# Patient Record
Sex: MALE | Race: WHITE | NOT HISPANIC OR LATINO | Employment: OTHER | URBAN - METROPOLITAN AREA
[De-identification: names, ages, dates, MRNs, and addresses within clinical notes are randomized per-mention and may not be internally consistent; named-entity substitution may affect disease eponyms.]

---

## 2022-07-30 ENCOUNTER — HOSPITAL ENCOUNTER (INPATIENT)
Facility: HOSPITAL | Age: 62
LOS: 5 days | Discharge: HOME HEALTH | End: 2022-08-04
Attending: EMERGENCY MEDICINE | Admitting: INTERNAL MEDICINE

## 2022-07-30 ENCOUNTER — APPOINTMENT (OUTPATIENT)
Dept: GENERAL RADIOLOGY | Facility: HOSPITAL | Age: 62
End: 2022-07-30

## 2022-07-30 DIAGNOSIS — L02.612 CELLULITIS AND ABSCESS OF TOE OF LEFT FOOT: Primary | ICD-10-CM

## 2022-07-30 DIAGNOSIS — R26.2 DIFFICULTY WALKING: ICD-10-CM

## 2022-07-30 DIAGNOSIS — L03.032 CELLULITIS AND ABSCESS OF TOE OF LEFT FOOT: Primary | ICD-10-CM

## 2022-07-30 DIAGNOSIS — Z78.9 DECREASED ACTIVITIES OF DAILY LIVING (ADL): ICD-10-CM

## 2022-07-30 PROBLEM — L02.619 CELLULITIS AND ABSCESS OF FOOT: Status: ACTIVE | Noted: 2022-07-30

## 2022-07-30 PROBLEM — L03.119 CELLULITIS AND ABSCESS OF FOOT: Status: ACTIVE | Noted: 2022-07-30

## 2022-07-30 LAB
ALBUMIN SERPL-MCNC: 2.9 G/DL (ref 3.5–5.2)
ALBUMIN/GLOB SERPL: 0.6 G/DL
ALP SERPL-CCNC: 175 U/L (ref 39–117)
ALT SERPL W P-5'-P-CCNC: 18 U/L (ref 1–41)
ANION GAP SERPL CALCULATED.3IONS-SCNC: 8.1 MMOL/L (ref 5–15)
ANION GAP SERPL CALCULATED.3IONS-SCNC: 9.7 MMOL/L (ref 5–15)
AST SERPL-CCNC: 22 U/L (ref 1–40)
BASOPHILS # BLD AUTO: 0.04 10*3/MM3 (ref 0–0.2)
BASOPHILS # BLD AUTO: 0.04 10*3/MM3 (ref 0–0.2)
BASOPHILS NFR BLD AUTO: 0.3 % (ref 0–1.5)
BASOPHILS NFR BLD AUTO: 0.3 % (ref 0–1.5)
BILIRUB SERPL-MCNC: 0.9 MG/DL (ref 0–1.2)
BUN SERPL-MCNC: 10 MG/DL (ref 6–20)
BUN SERPL-MCNC: 11 MG/DL (ref 6–20)
BUN/CREAT SERPL: 13 (ref 7–25)
BUN/CREAT SERPL: 15.1 (ref 7–25)
CALCIUM SPEC-SCNC: 8.8 MG/DL (ref 8.6–10.5)
CALCIUM SPEC-SCNC: 8.8 MG/DL (ref 8.6–10.5)
CHLORIDE SERPL-SCNC: 100 MMOL/L (ref 98–107)
CHLORIDE SERPL-SCNC: 97 MMOL/L (ref 98–107)
CO2 SERPL-SCNC: 24.3 MMOL/L (ref 22–29)
CO2 SERPL-SCNC: 27.9 MMOL/L (ref 22–29)
CREAT SERPL-MCNC: 0.73 MG/DL (ref 0.76–1.27)
CREAT SERPL-MCNC: 0.77 MG/DL (ref 0.76–1.27)
CRP SERPL-MCNC: 21.14 MG/DL (ref 0–0.5)
D-LACTATE SERPL-SCNC: 2 MMOL/L (ref 0.5–2)
DEPRECATED RDW RBC AUTO: 49.4 FL (ref 37–54)
DEPRECATED RDW RBC AUTO: 49.6 FL (ref 37–54)
EGFRCR SERPLBLD CKD-EPI 2021: 103.1 ML/MIN/1.73
EGFRCR SERPLBLD CKD-EPI 2021: 104.8 ML/MIN/1.73
EOSINOPHIL # BLD AUTO: 0.09 10*3/MM3 (ref 0–0.4)
EOSINOPHIL # BLD AUTO: 0.19 10*3/MM3 (ref 0–0.4)
EOSINOPHIL NFR BLD AUTO: 0.7 % (ref 0.3–6.2)
EOSINOPHIL NFR BLD AUTO: 1.5 % (ref 0.3–6.2)
ERYTHROCYTE [DISTWIDTH] IN BLOOD BY AUTOMATED COUNT: 14.8 % (ref 12.3–15.4)
ERYTHROCYTE [DISTWIDTH] IN BLOOD BY AUTOMATED COUNT: 15 % (ref 12.3–15.4)
ERYTHROCYTE [SEDIMENTATION RATE] IN BLOOD: 55 MM/HR (ref 0–20)
GLOBULIN UR ELPH-MCNC: 4.7 GM/DL
GLUCOSE SERPL-MCNC: 103 MG/DL (ref 65–99)
GLUCOSE SERPL-MCNC: 176 MG/DL (ref 65–99)
HCT VFR BLD AUTO: 45.7 % (ref 37.5–51)
HCT VFR BLD AUTO: 48.8 % (ref 37.5–51)
HGB BLD-MCNC: 14.9 G/DL (ref 13–17.7)
HGB BLD-MCNC: 15.6 G/DL (ref 13–17.7)
HOLD SPECIMEN: NORMAL
HOLD SPECIMEN: NORMAL
IMM GRANULOCYTES # BLD AUTO: 0.04 10*3/MM3 (ref 0–0.05)
IMM GRANULOCYTES # BLD AUTO: 0.05 10*3/MM3 (ref 0–0.05)
IMM GRANULOCYTES NFR BLD AUTO: 0.3 % (ref 0–0.5)
IMM GRANULOCYTES NFR BLD AUTO: 0.4 % (ref 0–0.5)
LYMPHOCYTES # BLD AUTO: 0.61 10*3/MM3 (ref 0.7–3.1)
LYMPHOCYTES # BLD AUTO: 1.05 10*3/MM3 (ref 0.7–3.1)
LYMPHOCYTES NFR BLD AUTO: 4.8 % (ref 19.6–45.3)
LYMPHOCYTES NFR BLD AUTO: 8.4 % (ref 19.6–45.3)
MCH RBC QN AUTO: 28.9 PG (ref 26.6–33)
MCH RBC QN AUTO: 29.3 PG (ref 26.6–33)
MCHC RBC AUTO-ENTMCNC: 32 G/DL (ref 31.5–35.7)
MCHC RBC AUTO-ENTMCNC: 32.6 G/DL (ref 31.5–35.7)
MCV RBC AUTO: 89.8 FL (ref 79–97)
MCV RBC AUTO: 90.5 FL (ref 79–97)
MONOCYTES # BLD AUTO: 0.53 10*3/MM3 (ref 0.1–0.9)
MONOCYTES # BLD AUTO: 1.08 10*3/MM3 (ref 0.1–0.9)
MONOCYTES NFR BLD AUTO: 4.1 % (ref 5–12)
MONOCYTES NFR BLD AUTO: 8.6 % (ref 5–12)
NEUTROPHILS NFR BLD AUTO: 10.1 10*3/MM3 (ref 1.7–7)
NEUTROPHILS NFR BLD AUTO: 11.52 10*3/MM3 (ref 1.7–7)
NEUTROPHILS NFR BLD AUTO: 80.9 % (ref 42.7–76)
NEUTROPHILS NFR BLD AUTO: 89.7 % (ref 42.7–76)
NRBC BLD AUTO-RTO: 0 /100 WBC (ref 0–0.2)
NRBC BLD AUTO-RTO: 0.2 /100 WBC (ref 0–0.2)
PLATELET # BLD AUTO: 229 10*3/MM3 (ref 140–450)
PLATELET # BLD AUTO: 237 10*3/MM3 (ref 140–450)
PMV BLD AUTO: 10.8 FL (ref 6–12)
PMV BLD AUTO: 11.3 FL (ref 6–12)
POTASSIUM SERPL-SCNC: 4 MMOL/L (ref 3.5–5.2)
POTASSIUM SERPL-SCNC: 4.3 MMOL/L (ref 3.5–5.2)
PROT SERPL-MCNC: 7.6 G/DL (ref 6–8.5)
RBC # BLD AUTO: 5.09 10*6/MM3 (ref 4.14–5.8)
RBC # BLD AUTO: 5.39 10*6/MM3 (ref 4.14–5.8)
SODIUM SERPL-SCNC: 131 MMOL/L (ref 136–145)
SODIUM SERPL-SCNC: 136 MMOL/L (ref 136–145)
WBC NRBC COR # BLD: 12.5 10*3/MM3 (ref 3.4–10.8)
WBC NRBC COR # BLD: 12.84 10*3/MM3 (ref 3.4–10.8)
WHOLE BLOOD HOLD COAG: NORMAL
WHOLE BLOOD HOLD SPECIMEN: NORMAL

## 2022-07-30 PROCEDURE — 25010000002 PIPERACILLIN SOD-TAZOBACTAM PER 1 G: Performed by: EMERGENCY MEDICINE

## 2022-07-30 PROCEDURE — 25010000002 TETANUS-DIPHTH-ACELL PERTUSSIS 5-2.5-18.5 LF-MCG/0.5 SUSPENSION PREFILLED SYRINGE: Performed by: EMERGENCY MEDICINE

## 2022-07-30 PROCEDURE — 85025 COMPLETE CBC W/AUTO DIFF WBC: CPT | Performed by: EMERGENCY MEDICINE

## 2022-07-30 PROCEDURE — 90471 IMMUNIZATION ADMIN: CPT | Performed by: EMERGENCY MEDICINE

## 2022-07-30 PROCEDURE — 73630 X-RAY EXAM OF FOOT: CPT

## 2022-07-30 PROCEDURE — 80053 COMPREHEN METABOLIC PANEL: CPT | Performed by: INTERNAL MEDICINE

## 2022-07-30 PROCEDURE — 25010000002 SODIUM CHLORIDE 0.9 % WITH KCL 20 MEQ 20-0.9 MEQ/L-% SOLUTION: Performed by: INTERNAL MEDICINE

## 2022-07-30 PROCEDURE — 87040 BLOOD CULTURE FOR BACTERIA: CPT | Performed by: EMERGENCY MEDICINE

## 2022-07-30 PROCEDURE — 94799 UNLISTED PULMONARY SVC/PX: CPT

## 2022-07-30 PROCEDURE — 85652 RBC SED RATE AUTOMATED: CPT | Performed by: EMERGENCY MEDICINE

## 2022-07-30 PROCEDURE — 25010000002 HYDROMORPHONE 1 MG/ML SOLUTION: Performed by: EMERGENCY MEDICINE

## 2022-07-30 PROCEDURE — 25010000002 ENOXAPARIN PER 10 MG: Performed by: INTERNAL MEDICINE

## 2022-07-30 PROCEDURE — 85025 COMPLETE CBC W/AUTO DIFF WBC: CPT | Performed by: INTERNAL MEDICINE

## 2022-07-30 PROCEDURE — 99283 EMERGENCY DEPT VISIT LOW MDM: CPT

## 2022-07-30 PROCEDURE — 90715 TDAP VACCINE 7 YRS/> IM: CPT | Performed by: EMERGENCY MEDICINE

## 2022-07-30 PROCEDURE — 86140 C-REACTIVE PROTEIN: CPT | Performed by: EMERGENCY MEDICINE

## 2022-07-30 PROCEDURE — 36415 COLL VENOUS BLD VENIPUNCTURE: CPT | Performed by: EMERGENCY MEDICINE

## 2022-07-30 PROCEDURE — 83605 ASSAY OF LACTIC ACID: CPT | Performed by: EMERGENCY MEDICINE

## 2022-07-30 PROCEDURE — 25010000002 VANCOMYCIN 5 G RECONSTITUTED SOLUTION: Performed by: EMERGENCY MEDICINE

## 2022-07-30 PROCEDURE — 99284 EMERGENCY DEPT VISIT MOD MDM: CPT

## 2022-07-30 RX ORDER — FAMOTIDINE 20 MG/1
20 TABLET, FILM COATED ORAL DAILY
Status: DISCONTINUED | OUTPATIENT
Start: 2022-07-30 | End: 2022-08-04 | Stop reason: HOSPADM

## 2022-07-30 RX ORDER — ACETAMINOPHEN 325 MG/1
650 TABLET ORAL EVERY 4 HOURS PRN
Status: DISCONTINUED | OUTPATIENT
Start: 2022-07-30 | End: 2022-08-04 | Stop reason: HOSPADM

## 2022-07-30 RX ORDER — BISACODYL 10 MG
10 SUPPOSITORY, RECTAL RECTAL DAILY PRN
Status: DISCONTINUED | OUTPATIENT
Start: 2022-07-30 | End: 2022-08-04 | Stop reason: HOSPADM

## 2022-07-30 RX ORDER — AMOXICILLIN 250 MG
2 CAPSULE ORAL NIGHTLY
Status: DISCONTINUED | OUTPATIENT
Start: 2022-07-30 | End: 2022-08-04 | Stop reason: HOSPADM

## 2022-07-30 RX ORDER — ACETAMINOPHEN 160 MG/5ML
650 SOLUTION ORAL EVERY 4 HOURS PRN
Status: DISCONTINUED | OUTPATIENT
Start: 2022-07-30 | End: 2022-08-04 | Stop reason: HOSPADM

## 2022-07-30 RX ORDER — POLYETHYLENE GLYCOL 3350 17 G/17G
17 POWDER, FOR SOLUTION ORAL DAILY PRN
Status: DISCONTINUED | OUTPATIENT
Start: 2022-07-30 | End: 2022-08-04 | Stop reason: HOSPADM

## 2022-07-30 RX ORDER — ACETAMINOPHEN 650 MG/1
650 SUPPOSITORY RECTAL EVERY 4 HOURS PRN
Status: DISCONTINUED | OUTPATIENT
Start: 2022-07-30 | End: 2022-08-04 | Stop reason: HOSPADM

## 2022-07-30 RX ORDER — HYDROCODONE BITARTRATE AND ACETAMINOPHEN 5; 325 MG/1; MG/1
1 TABLET ORAL EVERY 4 HOURS PRN
Status: DISCONTINUED | OUTPATIENT
Start: 2022-07-30 | End: 2022-08-04 | Stop reason: HOSPADM

## 2022-07-30 RX ORDER — ENOXAPARIN SODIUM 100 MG/ML
40 INJECTION SUBCUTANEOUS DAILY
Status: DISCONTINUED | OUTPATIENT
Start: 2022-07-30 | End: 2022-08-04 | Stop reason: HOSPADM

## 2022-07-30 RX ORDER — BISACODYL 5 MG/1
5 TABLET, DELAYED RELEASE ORAL DAILY PRN
Status: DISCONTINUED | OUTPATIENT
Start: 2022-07-30 | End: 2022-08-04 | Stop reason: HOSPADM

## 2022-07-30 RX ORDER — NALOXONE HCL 0.4 MG/ML
0.4 VIAL (ML) INJECTION
Status: DISCONTINUED | OUTPATIENT
Start: 2022-07-30 | End: 2022-08-04 | Stop reason: HOSPADM

## 2022-07-30 RX ORDER — ONDANSETRON 2 MG/ML
4 INJECTION INTRAMUSCULAR; INTRAVENOUS EVERY 6 HOURS PRN
Status: DISCONTINUED | OUTPATIENT
Start: 2022-07-30 | End: 2022-08-04 | Stop reason: HOSPADM

## 2022-07-30 RX ORDER — SODIUM CHLORIDE 0.9 % (FLUSH) 0.9 %
10 SYRINGE (ML) INJECTION AS NEEDED
Status: DISCONTINUED | OUTPATIENT
Start: 2022-07-30 | End: 2022-08-04 | Stop reason: HOSPADM

## 2022-07-30 RX ORDER — SODIUM CHLORIDE AND POTASSIUM CHLORIDE 150; 900 MG/100ML; MG/100ML
50 INJECTION, SOLUTION INTRAVENOUS CONTINUOUS
Status: DISCONTINUED | OUTPATIENT
Start: 2022-07-30 | End: 2022-08-04 | Stop reason: HOSPADM

## 2022-07-30 RX ADMIN — POTASSIUM CHLORIDE AND SODIUM CHLORIDE 100 ML/HR: 900; 150 INJECTION, SOLUTION INTRAVENOUS at 18:24

## 2022-07-30 RX ADMIN — FAMOTIDINE 20 MG: 20 TABLET ORAL at 17:23

## 2022-07-30 RX ADMIN — TETANUS TOXOID, REDUCED DIPHTHERIA TOXOID AND ACELLULAR PERTUSSIS VACCINE, ADSORBED 0.5 ML: 5; 2.5; 8; 8; 2.5 SUSPENSION INTRAMUSCULAR at 14:59

## 2022-07-30 RX ADMIN — ENOXAPARIN SODIUM 40 MG: 100 INJECTION SUBCUTANEOUS at 17:23

## 2022-07-30 RX ADMIN — VANCOMYCIN HYDROCHLORIDE 2000 MG: 5 INJECTION, POWDER, LYOPHILIZED, FOR SOLUTION INTRAVENOUS at 15:46

## 2022-07-30 RX ADMIN — PIPERACILLIN AND TAZOBACTAM 4.5 G: 4; .5 INJECTION, POWDER, FOR SOLUTION INTRAVENOUS at 14:55

## 2022-07-30 RX ADMIN — HYDROMORPHONE HYDROCHLORIDE 0.5 MG: 1 INJECTION, SOLUTION INTRAMUSCULAR; INTRAVENOUS; SUBCUTANEOUS at 14:55

## 2022-07-31 ENCOUNTER — APPOINTMENT (OUTPATIENT)
Dept: MRI IMAGING | Facility: HOSPITAL | Age: 62
End: 2022-07-31

## 2022-07-31 PROBLEM — M06.9 RHEUMATOID ARTHRITIS INVOLVING MULTIPLE SITES: Chronic | Status: ACTIVE | Noted: 2022-07-31

## 2022-07-31 LAB
ANION GAP SERPL CALCULATED.3IONS-SCNC: 11.2 MMOL/L (ref 5–15)
BASOPHILS # BLD AUTO: 0.05 10*3/MM3 (ref 0–0.2)
BASOPHILS NFR BLD AUTO: 0.5 % (ref 0–1.5)
BUN SERPL-MCNC: 13 MG/DL (ref 6–20)
BUN/CREAT SERPL: 14.6 (ref 7–25)
CALCIUM SPEC-SCNC: 8.9 MG/DL (ref 8.6–10.5)
CHLORIDE SERPL-SCNC: 100 MMOL/L (ref 98–107)
CO2 SERPL-SCNC: 22.8 MMOL/L (ref 22–29)
CREAT SERPL-MCNC: 0.89 MG/DL (ref 0.76–1.27)
DEPRECATED RDW RBC AUTO: 49.7 FL (ref 37–54)
EGFRCR SERPLBLD CKD-EPI 2021: 98.7 ML/MIN/1.73
EOSINOPHIL # BLD AUTO: 0.32 10*3/MM3 (ref 0–0.4)
EOSINOPHIL NFR BLD AUTO: 2.9 % (ref 0.3–6.2)
ERYTHROCYTE [DISTWIDTH] IN BLOOD BY AUTOMATED COUNT: 15.1 % (ref 12.3–15.4)
GLUCOSE SERPL-MCNC: 115 MG/DL (ref 65–99)
HCT VFR BLD AUTO: 46.2 % (ref 37.5–51)
HGB BLD-MCNC: 15 G/DL (ref 13–17.7)
IMM GRANULOCYTES # BLD AUTO: 0.04 10*3/MM3 (ref 0–0.05)
IMM GRANULOCYTES NFR BLD AUTO: 0.4 % (ref 0–0.5)
LYMPHOCYTES # BLD AUTO: 0.87 10*3/MM3 (ref 0.7–3.1)
LYMPHOCYTES NFR BLD AUTO: 8 % (ref 19.6–45.3)
MCH RBC QN AUTO: 28.8 PG (ref 26.6–33)
MCHC RBC AUTO-ENTMCNC: 32.5 G/DL (ref 31.5–35.7)
MCV RBC AUTO: 88.8 FL (ref 79–97)
MONOCYTES # BLD AUTO: 0.94 10*3/MM3 (ref 0.1–0.9)
MONOCYTES NFR BLD AUTO: 8.6 % (ref 5–12)
NEUTROPHILS NFR BLD AUTO: 79.6 % (ref 42.7–76)
NEUTROPHILS NFR BLD AUTO: 8.68 10*3/MM3 (ref 1.7–7)
NRBC BLD AUTO-RTO: 0 /100 WBC (ref 0–0.2)
PLATELET # BLD AUTO: 273 10*3/MM3 (ref 140–450)
PMV BLD AUTO: 11 FL (ref 6–12)
POTASSIUM SERPL-SCNC: 4.2 MMOL/L (ref 3.5–5.2)
RBC # BLD AUTO: 5.2 10*6/MM3 (ref 4.14–5.8)
SODIUM SERPL-SCNC: 134 MMOL/L (ref 136–145)
WBC NRBC COR # BLD: 10.9 10*3/MM3 (ref 3.4–10.8)

## 2022-07-31 PROCEDURE — 73718 MRI LOWER EXTREMITY W/O DYE: CPT

## 2022-07-31 PROCEDURE — 85025 COMPLETE CBC W/AUTO DIFF WBC: CPT | Performed by: INTERNAL MEDICINE

## 2022-07-31 PROCEDURE — 80048 BASIC METABOLIC PNL TOTAL CA: CPT | Performed by: INTERNAL MEDICINE

## 2022-07-31 PROCEDURE — 25010000002 SODIUM CHLORIDE 0.9 % WITH KCL 20 MEQ 20-0.9 MEQ/L-% SOLUTION: Performed by: INTERNAL MEDICINE

## 2022-07-31 PROCEDURE — 94799 UNLISTED PULMONARY SVC/PX: CPT

## 2022-07-31 PROCEDURE — 25010000002 ENOXAPARIN PER 10 MG: Performed by: INTERNAL MEDICINE

## 2022-07-31 PROCEDURE — 25010000002 PIPERACILLIN SOD-TAZOBACTAM PER 1 G: Performed by: INTERNAL MEDICINE

## 2022-07-31 RX ORDER — HYDROCODONE BITARTRATE AND ACETAMINOPHEN 10; 325 MG/1; MG/1
1 TABLET ORAL EVERY 8 HOURS
Status: DISCONTINUED | OUTPATIENT
Start: 2022-07-31 | End: 2022-08-04 | Stop reason: HOSPADM

## 2022-07-31 RX ADMIN — TAZOBACTAM SODIUM AND PIPERACILLIN SODIUM 3.38 G: 375; 3 INJECTION, SOLUTION INTRAVENOUS at 07:40

## 2022-07-31 RX ADMIN — TAZOBACTAM SODIUM AND PIPERACILLIN SODIUM 3.38 G: 375; 3 INJECTION, SOLUTION INTRAVENOUS at 16:09

## 2022-07-31 RX ADMIN — HYDROCODONE BITARTRATE AND ACETAMINOPHEN 1 TABLET: 5; 325 TABLET ORAL at 03:25

## 2022-07-31 RX ADMIN — Medication 473 ML: at 22:34

## 2022-07-31 RX ADMIN — HYDROCODONE BITARTRATE AND ACETAMINOPHEN 1 TABLET: 5; 325 TABLET ORAL at 18:22

## 2022-07-31 RX ADMIN — HYDROCODONE BITARTRATE AND ACETAMINOPHEN 1 TABLET: 10; 325 TABLET ORAL at 13:25

## 2022-07-31 RX ADMIN — ENOXAPARIN SODIUM 40 MG: 100 INJECTION SUBCUTANEOUS at 08:02

## 2022-07-31 RX ADMIN — FAMOTIDINE 20 MG: 20 TABLET ORAL at 08:02

## 2022-07-31 RX ADMIN — TAZOBACTAM SODIUM AND PIPERACILLIN SODIUM 3.38 G: 375; 3 INJECTION, SOLUTION INTRAVENOUS at 00:32

## 2022-07-31 RX ADMIN — HYDROCODONE BITARTRATE AND ACETAMINOPHEN 1 TABLET: 10; 325 TABLET ORAL at 20:17

## 2022-07-31 RX ADMIN — POTASSIUM CHLORIDE AND SODIUM CHLORIDE 100 ML/HR: 900; 150 INJECTION, SOLUTION INTRAVENOUS at 07:42

## 2022-07-31 RX ADMIN — SENNOSIDES AND DOCUSATE SODIUM 2 TABLET: 50; 8.6 TABLET ORAL at 20:13

## 2022-08-01 ENCOUNTER — APPOINTMENT (OUTPATIENT)
Dept: CARDIOLOGY | Facility: HOSPITAL | Age: 62
End: 2022-08-01

## 2022-08-01 LAB
ALBUMIN SERPL-MCNC: 2.8 G/DL (ref 3.5–5.2)
ALBUMIN/GLOB SERPL: 0.7 G/DL
ALP SERPL-CCNC: 175 U/L (ref 39–117)
ALT SERPL W P-5'-P-CCNC: 16 U/L (ref 1–41)
ANION GAP SERPL CALCULATED.3IONS-SCNC: 6.7 MMOL/L (ref 5–15)
AST SERPL-CCNC: 15 U/L (ref 1–40)
BASOPHILS # BLD AUTO: 0.06 10*3/MM3 (ref 0–0.2)
BASOPHILS NFR BLD AUTO: 0.7 % (ref 0–1.5)
BH CV LOWER ARTERIAL LEFT ABI RATIO: 1.41
BH CV LOWER ARTERIAL LEFT DORSALIS PEDIS SYS MAX: 176
BH CV LOWER ARTERIAL LEFT LOW THIGH SYS MAX: 173
BH CV LOWER ARTERIAL LEFT POPLITEAL SYS MAX: 192
BH CV LOWER ARTERIAL LEFT POST TIBIAL SYS MAX: 198
BH CV LOWER ARTERIAL RIGHT ABI RATIO: 1.34
BH CV LOWER ARTERIAL RIGHT DORSALIS PEDIS SYS MAX: 180
BH CV LOWER ARTERIAL RIGHT GREAT TOE SYS MAX: 125
BH CV LOWER ARTERIAL RIGHT LOW THIGH SYS MAX: 182
BH CV LOWER ARTERIAL RIGHT POPLITEAL SYS MAX: 192
BH CV LOWER ARTERIAL RIGHT POST TIBIAL SYS MAX: 188
BH CV LOWER ARTERIAL RIGHT TBI RATIO: 0.89
BILIRUB SERPL-MCNC: 0.9 MG/DL (ref 0–1.2)
BUN SERPL-MCNC: 14 MG/DL (ref 6–20)
BUN/CREAT SERPL: 17.1 (ref 7–25)
CALCIUM SPEC-SCNC: 8.8 MG/DL (ref 8.6–10.5)
CHLORIDE SERPL-SCNC: 102 MMOL/L (ref 98–107)
CO2 SERPL-SCNC: 27.3 MMOL/L (ref 22–29)
CREAT SERPL-MCNC: 0.82 MG/DL (ref 0.76–1.27)
CRP SERPL-MCNC: 12.13 MG/DL (ref 0–0.5)
DEPRECATED RDW RBC AUTO: 49.2 FL (ref 37–54)
EGFRCR SERPLBLD CKD-EPI 2021: 101.2 ML/MIN/1.73
EOSINOPHIL # BLD AUTO: 0.44 10*3/MM3 (ref 0–0.4)
EOSINOPHIL NFR BLD AUTO: 5.5 % (ref 0.3–6.2)
ERYTHROCYTE [DISTWIDTH] IN BLOOD BY AUTOMATED COUNT: 15 % (ref 12.3–15.4)
GLOBULIN UR ELPH-MCNC: 4.2 GM/DL
GLUCOSE SERPL-MCNC: 130 MG/DL (ref 65–99)
HCT VFR BLD AUTO: 43.9 % (ref 37.5–51)
HGB BLD-MCNC: 14.4 G/DL (ref 13–17.7)
IMM GRANULOCYTES # BLD AUTO: 0.03 10*3/MM3 (ref 0–0.05)
IMM GRANULOCYTES NFR BLD AUTO: 0.4 % (ref 0–0.5)
LYMPHOCYTES # BLD AUTO: 0.97 10*3/MM3 (ref 0.7–3.1)
LYMPHOCYTES NFR BLD AUTO: 12.1 % (ref 19.6–45.3)
MAXIMAL PREDICTED HEART RATE: 161 BPM
MCH RBC QN AUTO: 29.1 PG (ref 26.6–33)
MCHC RBC AUTO-ENTMCNC: 32.8 G/DL (ref 31.5–35.7)
MCV RBC AUTO: 88.7 FL (ref 79–97)
MONOCYTES # BLD AUTO: 0.88 10*3/MM3 (ref 0.1–0.9)
MONOCYTES NFR BLD AUTO: 11 % (ref 5–12)
NEUTROPHILS NFR BLD AUTO: 5.63 10*3/MM3 (ref 1.7–7)
NEUTROPHILS NFR BLD AUTO: 70.3 % (ref 42.7–76)
NRBC BLD AUTO-RTO: 0 /100 WBC (ref 0–0.2)
PLATELET # BLD AUTO: 270 10*3/MM3 (ref 140–450)
PMV BLD AUTO: 10.4 FL (ref 6–12)
POTASSIUM SERPL-SCNC: 4.2 MMOL/L (ref 3.5–5.2)
PROT SERPL-MCNC: 7 G/DL (ref 6–8.5)
RBC # BLD AUTO: 4.95 10*6/MM3 (ref 4.14–5.8)
SODIUM SERPL-SCNC: 136 MMOL/L (ref 136–145)
STRESS TARGET HR: 137 BPM
UPPER ARTERIAL LEFT ARM BRACHIAL SYS MAX: 147 MMHG
UPPER ARTERIAL RIGHT ARM BRACHIAL SYS MAX: 120 MMHG
WBC NRBC COR # BLD: 8.01 10*3/MM3 (ref 3.4–10.8)

## 2022-08-01 PROCEDURE — 85025 COMPLETE CBC W/AUTO DIFF WBC: CPT | Performed by: INTERNAL MEDICINE

## 2022-08-01 PROCEDURE — 93923 UPR/LXTR ART STDY 3+ LVLS: CPT

## 2022-08-01 PROCEDURE — 87205 SMEAR GRAM STAIN: CPT | Performed by: PODIATRIST

## 2022-08-01 PROCEDURE — 94799 UNLISTED PULMONARY SVC/PX: CPT

## 2022-08-01 PROCEDURE — 99221 1ST HOSP IP/OBS SF/LOW 40: CPT | Performed by: PODIATRIST

## 2022-08-01 PROCEDURE — 25010000002 ENOXAPARIN PER 10 MG: Performed by: INTERNAL MEDICINE

## 2022-08-01 PROCEDURE — 87147 CULTURE TYPE IMMUNOLOGIC: CPT | Performed by: PODIATRIST

## 2022-08-01 PROCEDURE — 25010000002 PIPERACILLIN SOD-TAZOBACTAM PER 1 G: Performed by: INTERNAL MEDICINE

## 2022-08-01 PROCEDURE — 80053 COMPREHEN METABOLIC PANEL: CPT | Performed by: INTERNAL MEDICINE

## 2022-08-01 PROCEDURE — 25010000002 HYDROMORPHONE 1 MG/ML SOLUTION: Performed by: INTERNAL MEDICINE

## 2022-08-01 PROCEDURE — 87070 CULTURE OTHR SPECIMN AEROBIC: CPT | Performed by: PODIATRIST

## 2022-08-01 PROCEDURE — 86140 C-REACTIVE PROTEIN: CPT | Performed by: INTERNAL MEDICINE

## 2022-08-01 PROCEDURE — 10061 I&D ABSCESS COMP/MULTIPLE: CPT | Performed by: PODIATRIST

## 2022-08-01 PROCEDURE — 87186 SC STD MICRODIL/AGAR DIL: CPT | Performed by: PODIATRIST

## 2022-08-01 PROCEDURE — 25010000002 SODIUM CHLORIDE 0.9 % WITH KCL 20 MEQ 20-0.9 MEQ/L-% SOLUTION: Performed by: INTERNAL MEDICINE

## 2022-08-01 PROCEDURE — 93923 UPR/LXTR ART STDY 3+ LVLS: CPT | Performed by: SURGERY

## 2022-08-01 RX ORDER — PREDNISOLONE ACETATE 10 MG/ML
2 SUSPENSION/ DROPS OPHTHALMIC EVERY 8 HOURS SCHEDULED
Status: DISCONTINUED | OUTPATIENT
Start: 2022-08-01 | End: 2022-08-04 | Stop reason: HOSPADM

## 2022-08-01 RX ADMIN — TAZOBACTAM SODIUM AND PIPERACILLIN SODIUM 3.38 G: 375; 3 INJECTION, SOLUTION INTRAVENOUS at 00:11

## 2022-08-01 RX ADMIN — HYDROCODONE BITARTRATE AND ACETAMINOPHEN 1 TABLET: 10; 325 TABLET ORAL at 20:27

## 2022-08-01 RX ADMIN — TAZOBACTAM SODIUM AND PIPERACILLIN SODIUM 3.38 G: 375; 3 INJECTION, SOLUTION INTRAVENOUS at 16:00

## 2022-08-01 RX ADMIN — HYDROCODONE BITARTRATE AND ACETAMINOPHEN 1 TABLET: 10; 325 TABLET ORAL at 11:51

## 2022-08-01 RX ADMIN — ENOXAPARIN SODIUM 40 MG: 100 INJECTION SUBCUTANEOUS at 10:01

## 2022-08-01 RX ADMIN — HYDROMORPHONE HYDROCHLORIDE 0.5 MG: 1 INJECTION, SOLUTION INTRAMUSCULAR; INTRAVENOUS; SUBCUTANEOUS at 10:06

## 2022-08-01 RX ADMIN — SENNOSIDES AND DOCUSATE SODIUM 2 TABLET: 50; 8.6 TABLET ORAL at 20:27

## 2022-08-01 RX ADMIN — TAZOBACTAM SODIUM AND PIPERACILLIN SODIUM 3.38 G: 375; 3 INJECTION, SOLUTION INTRAVENOUS at 10:01

## 2022-08-01 RX ADMIN — PREDNISOLONE ACETATE 2 DROP: 10 SUSPENSION/ DROPS OPHTHALMIC at 21:26

## 2022-08-01 RX ADMIN — Medication 473 ML: at 21:26

## 2022-08-01 RX ADMIN — HYDROCODONE BITARTRATE AND ACETAMINOPHEN 1 TABLET: 10; 325 TABLET ORAL at 04:08

## 2022-08-01 RX ADMIN — Medication 473 ML: at 10:02

## 2022-08-01 RX ADMIN — POTASSIUM CHLORIDE AND SODIUM CHLORIDE 50 ML/HR: 900; 150 INJECTION, SOLUTION INTRAVENOUS at 18:36

## 2022-08-01 RX ADMIN — FAMOTIDINE 20 MG: 20 TABLET ORAL at 10:01

## 2022-08-01 NOTE — CONSULTS
[facility]   HISTORY AND PHYSICAL    Patient Name: Dank Johnson  : 1960  MRN: [id number]  Primary Care Physician:  Pardeep Le MD  Date of admission: 2022    Subjective   Subjective     Chief Complaint: Left forefoot cellulitis    HPI:    Dank Johnson is a 61 y.o. male  came to emergency room for left foot pain.  Patient has advanced  rheumatoid arthritis, joint deformities, he was standing on a ladder on Thursday and developed pain in his foot and subsequently noticed some drainage and ulcer on the foot in a hole on top of his left foot.  Patient reported to ER today as having difficulty with pain and drainage.  Work-up in the ER revealed extensive DJD changes and high white cell count along with high CRP.  Patient being admitted for ulcer of the foot and cellulitis.    Patient relates numbness in his feet.    Patient denies any fevers, chills, nausea, vomiting, shortness of breathe, nor any other constitutional signs nor symptoms.    Review of Systems   All systems were reviewed and negative except for: Left forefoot infection    Personal History     Past Medical History:   Diagnosis Date   • Arthritis        Past Surgical History:   Procedure Laterality Date   • EYE SURGERY Right     cornea transplant       Family History: family history is not on file. Otherwise pertinent FHx was reviewed and not pertinent to current issue.    Social History:  reports that he has been smoking cigarettes. He started smoking about 45 years ago. He has been smoking about 1.00 pack per day. He has never used smokeless tobacco. He reports current alcohol use. He reports previous drug use.    Home Medications:         Allergies:  No Known Allergies    Objective   Objective     Vitals:   Temp:  [97.3 °F (36.3 °C)-98.8 °F (37.1 °C)] 97.8 °F (36.6 °C)  Heart Rate:  [36-80] 70  Resp:  [16-20] 16  BP: (126-147)/(63-95) 138/95  Physical Exam      GEN:   A&Ox3, Patient seen at bedside in no apparent  distress.    Physical Exam  Vitals and nursing note reviewed.   Constitutional:       General: He is not in acute distress.     Appearance: He is obese. He is ill-appearing.   Cardiovascular:      Pulses:           Dorsalis pedis pulses are 2+ on the right side and 2+ on the left side.        Posterior tibial pulses are 2+ on the right side and 2+ on the left side.   Musculoskeletal:      Right foot: Bunion present.      Left foot: Bunion present.   Feet:      Right foot:      Protective Sensation: 10 sites sensed.      Skin integrity: Warmth present.      Toenail Condition: Right toenails are normal.      Left foot:      Protective Sensation: 10 sites sensed.      Skin integrity: Ulcer, erythema and warmth present.      Toenail Condition: Left toenails are normal.         Foot/Ankle Exam:       General:   Appearance: obesity    Appearance comment:  Chronically ill  Orientation: AAOx3    Affect: appropriate      VASCULAR      Right Foot Vascularity   Normal vascular exam    Dorsalis pedis:  2+  Posterior tibial:  2+  Skin Temperature: warm    Edema Gradin+ and pitting  CFT:  < 3 seconds  Pedal Hair Growth:  Present  Varicosities: mild varicosities       Left Foot Vascularity   Normal vascular exam    Dorsalis pedis:  2+  Posterior tibial:  2+  Skin Temperature: warm    Edema Gradin+ and pitting  CFT:  < 3 seconds  Pedal Hair Growth:  Present  Varicosities: mild varicosities        NEUROLOGIC     Right Foot Neurologic   Normal sensation    Light touch sensation:  Normal  Vibratory sensation:  Normal  Hot/Cold sensation: normal    Protective Sensation using Valentine-Jenifer Monofilament:  10     Left Foot Neurologic   Normal sensation    Light touch sensation:  Normal  Vibratory sensation:  Normal  Hot/cold sensation: normal    Protective Sensation using Valentine-Jenifer Monofilament:  10     MUSCULOSKELETAL      Right Foot Musculoskeletal   Hammertoe:  Second toe, third toe, fourth toe and fifth toe  Hallux  valgus: Yes       Left Foot Musculoskeletal   Hammertoe:  Second toe, third toe, fourth toe and fifth toe  Hallux valgus: Yes       MUSCLE STRENGTH     Right Foot Muscle Strength   Foot dorsiflexion:  4  Foot plantar flexion:  4  Foot inversion:  4  Foot eversion:  4     Left Foot Muscle Strength   Foot dorsiflexion:  4  Foot plantar flexion:  4  Foot inversion:  4  Foot eversion:  4     RANGE OF MOTION      Right Foot Range of Motion   Foot and ankle ROM within normal limits       Left Foot Range of Motion   Foot and ankle ROM within normal limits       DERMATOLOGIC     Right Foot Dermatologic   Skin: skin intact    Nails: normal       Left Foot Dermatologic   Skin: cellulitis, drainage, erythema and ulcer    Nails: normal        Left Foot Additional Comments: Left second metatarsal head area of the foot that encompasses the medial aspect of the left second toe extends dorsally..  Nonviable tissue is present.  Large fluctuant area seen.  Upon incision and drainage of this area, copious amounts of purulent drainage were expressed from this area.  Nonviable tissue was debrided with #15 scalpel blade via excisional subcutaneous debridement down to the muscle layer.  Deep wound culture was taken by nursing.  This area was dressed with Dakin's wet-to-dry dressing.                  XR Foot 3+ View Left    Result Date: 7/30/2022  Narrative: PROCEDURE: XR FOOT 3+ VW LEFT  COMPARISON: None  INDICATIONS: Infection left foot pain  FINDINGS:  No acute fracture is identified.  There are severe degenerative changes along the metatarsophalangeal joints with associated subluxation and/or dislocation.  The soft tissues are unremarkable.      Impression:  Severe degenerative changes along metatarsophalangeal joints with associated subluxation and/or dislocation.  The chronicity of this finding is unclear.  No prior imaging is available for comparison.  Given history, a superimposed infection cannot be excluded.      CRISTINA WHITE  MD       Electronically Signed and Approved By: CRISTINA WHITE MD on 7/30/2022 at 15:15             MRI Foot Left Without Contrast    Result Date: 7/31/2022  Narrative: PROCEDURE: MRI FOOT LEFT WO CONTRAST  COMPARISON:  [facility], CR, XR FOOT 3+ VW LEFT, 7/30/2022, 14:44. INDICATIONS: REDNESS AND SWELLING TO ENTIRE LEFT FOREFOOT.      TECHNIQUE: Multiplanar multisequence images of the left forefoot.   FINDINGS:  There is generalized soft tissue edema/cellulitis.  There are advanced degenerative changes in the MTP joints.  There is severe hallux valgus deformity.  No fractures are identified.  No convincing evidence osteomyelitis.  No evidence of abscess.  Degenerative changes are present in the midfoot.  IMPRESSION: 1. Generalized soft tissue edema/cellulitis.  2. Advanced degenerative changes in the MTP joints.  Suggest correlation with any history inflammatory arthritis such as psoriatic arthritis or gout.  3. No evidence of abscess.  No convincing evidence of osteomyelitis.   WILL HARTMANN MD       Electronically Signed and Approved By: WILL HARTMANN MD on 7/31/2022 at 12:59             Doppler Arterial Multi Level Lower Extremity - Bilateral CAR    Result Date: 8/1/2022  Narrative: · Right Conclusion: The right MARY is normal. Normal digital pressures. · Left Conclusion: The left MARY is normal. Left digit pressure declined by patient. · Normal arterial evaluation of both lower extremities.          Result Review    Result Review:  I have personally reviewed the results from the time of this admission to 8/1/2022 16:28 EDT and agree with these findings:  [x]  Laboratory  []  Microbiology  [x]  Radiology  []  EKG/Telemetry   []  Cardiology/Vascular   []  Pathology  []  Old records  []  Other:      WBC   Date Value Ref Range Status   08/01/2022 8.01 3.40 - 10.80 10*3/mm3 Final     RBC   Date Value Ref Range Status   08/01/2022 4.95 4.14 - 5.80 10*6/mm3 Final     Hemoglobin   Date Value Ref  Range Status   08/01/2022 14.4 13.0 - 17.7 g/dL Final     Hematocrit   Date Value Ref Range Status   08/01/2022 43.9 37.5 - 51.0 % Final     MCV   Date Value Ref Range Status   08/01/2022 88.7 79.0 - 97.0 fL Final     MCH   Date Value Ref Range Status   08/01/2022 29.1 26.6 - 33.0 pg Final     MCHC   Date Value Ref Range Status   08/01/2022 32.8 31.5 - 35.7 g/dL Final     RDW   Date Value Ref Range Status   08/01/2022 15.0 12.3 - 15.4 % Final     RDW-SD   Date Value Ref Range Status   08/01/2022 49.2 37.0 - 54.0 fl Final     MPV   Date Value Ref Range Status   08/01/2022 10.4 6.0 - 12.0 fL Final     Platelets   Date Value Ref Range Status   08/01/2022 270 140 - 450 10*3/mm3 Final     Neutrophil %   Date Value Ref Range Status   08/01/2022 70.3 42.7 - 76.0 % Final     Lymphocyte %   Date Value Ref Range Status   08/01/2022 12.1 (L) 19.6 - 45.3 % Final     Monocyte %   Date Value Ref Range Status   08/01/2022 11.0 5.0 - 12.0 % Final     Eosinophil %   Date Value Ref Range Status   08/01/2022 5.5 0.3 - 6.2 % Final     Basophil %   Date Value Ref Range Status   08/01/2022 0.7 0.0 - 1.5 % Final     Immature Grans %   Date Value Ref Range Status   08/01/2022 0.4 0.0 - 0.5 % Final     Neutrophils, Absolute   Date Value Ref Range Status   08/01/2022 5.63 1.70 - 7.00 10*3/mm3 Final     Lymphocytes, Absolute   Date Value Ref Range Status   08/01/2022 0.97 0.70 - 3.10 10*3/mm3 Final     Monocytes, Absolute   Date Value Ref Range Status   08/01/2022 0.88 0.10 - 0.90 10*3/mm3 Final     Eosinophils, Absolute   Date Value Ref Range Status   08/01/2022 0.44 (H) 0.00 - 0.40 10*3/mm3 Final     Basophils, Absolute   Date Value Ref Range Status   08/01/2022 0.06 0.00 - 0.20 10*3/mm3 Final     Immature Grans, Absolute   Date Value Ref Range Status   08/01/2022 0.03 0.00 - 0.05 10*3/mm3 Final     nRBC   Date Value Ref Range Status   08/01/2022 0.0 0.0 - 0.2 /100 WBC Final        Lab Results   Component Value Date    GLUCOSE 130 (H)  08/01/2022    BUN 14 08/01/2022    CREATININE 0.82 08/01/2022    BCR 17.1 08/01/2022    K 4.2 08/01/2022    CO2 27.3 08/01/2022    CALCIUM 8.8 08/01/2022    ALBUMIN 2.80 (L) 08/01/2022    AST 15 08/01/2022    ALT 16 08/01/2022             Most notable findings include: Cellulitis with abscess right forefoot.    Assessment & Plan   Assessment / Plan       Active Hospital Problems:  Active Hospital Problems    Diagnosis    • **Cellulitis and abscess of foot    • Rheumatoid arthritis involving multiple sites (HCC)        Patient Active Problem List   Diagnosis   • Cellulitis and abscess of foot   • Rheumatoid arthritis involving multiple sites (HCC)         Plan:     Comprehensive podiatric exam was completed on the patient.    Ordered:  -wound culture  -wound care nurse consult  -wound care orders    Discussed with patient's nurse.    Thank you for including me in the care of this patient.      DVT prophylaxis:  Medical DVT prophylaxis orders are present.    CODE STATUS:    Code Status (Patient has no pulse and is not breathing): CPR (Attempt to Resuscitate)  Medical Interventions (Patient has pulse or is breathing): Full Support      Electronically signed by Abel German DPM, 08/01/22, 7:01 AM EDT.

## 2022-08-01 NOTE — PLAN OF CARE
Goal Outcome Evaluation:  Plan of Care Reviewed With: patient        Progress: no change  Outcome Evaluation: Took over care for patient at 0300. VSS, patient rested well. Scheduled pain medicine reported to be controlling pain well.

## 2022-08-01 NOTE — CONSULTS
[facility]   Consult Note    Patient Name: Dank Johnson  : 1960  MRN: [id number]  Primary Care Physician: Pardeep Le MD  Referring Physician: No Known Provider  Date of admission: 2022    Consults  Subjective   Subjective     Reason for Consult/ Chief Complaint:     61-year-old pleasant  male with a history of rheumatoid arthritis had been diagnosed age of 11 he is not aware that he has been diagnosed juvenile rheumatoid arthritis.  Patient is a good historian he had been seeing Dr. Lynch in the past last time couple of years ago  had been treated with methotrexate sulfasalazine he has been tolerated very well per patient he took Remicade at least 6 years last infusion he had some allergic type of reaction he stopped taking Remicade did not go back to the follow-up but is doing well due to pain medicine for his rheumatoid arthritis.  He was getting some relief with over-the-counter arthritis pain medicines never used any other DMARDs or biologic therapy per patient he cannot take any himself injection medicine he has been scared of needles.  He had been admitted for left foot toe pain and swelling cellulitis.  With his chronic deformities he did not complain of any worsening pain.  Did not complain of any history of chronic neck pain back pain.    He had a history of right eye  at least 2 corneal transplant   looking forward to have third transplant without any history of painful eye .  He is not aware of any previous diagnosis of other systemic involvement due to chronic rheumatoid arthritis.    History of Present Illness    Review of Systems   Constitutional: Positive for activity change and fatigue. Negative for fever.   Musculoskeletal: Positive for arthralgias. Negative for myalgias and neck pain.   Skin: Positive for rash.   Neurological: Negative for dizziness.             Personal History     Past Medical History:   Diagnosis Date   • Arthritis        Past Surgical History:    Procedure Laterality Date   • EYE SURGERY Right     cornea transplant       Family History: family history is not on file. Otherwise pertinent FHx was reviewed and not pertinent to current issue.    Social History:  reports that he has been smoking cigarettes. He started smoking about 45 years ago. He has been smoking about 1.00 pack per day. He has never used smokeless tobacco. He reports current alcohol use. He reports previous drug use.    Home Medications:         Allergies:  No Known Allergies    Objective    Objective   Vitals:  Temp:  [97.3 °F (36.3 °C)-98.8 °F (37.1 °C)] 97.8 °F (36.6 °C)  Heart Rate:  [36-80] 70  Resp:  [16-20] 16  BP: (126-147)/(63-95) 138/95    Physical Exam:   General: Moderate overweight status ,comfortable in bed without any distress  Skin:  No Ecchymosis, no rash, no bruises  HEENT: ANGELO left eye , EOMI bilateral, right pale sclera conjunctiva congestion left clear    Neck: Supple no lymphadenopathy  Chest: Clear to auscultate , Normal air entry, no crepitus or rhonchi   CVS: Regular rhythm , no murmur, no gallop   Abdomen: Soft non tender, no organomegaly   Musculoskeletal :   Neck bilateral shoulder  normal range of movement   Bilateral elbow mild limited range of movement mild puffiness few rheumatoid nodules, bilateral wrist moderate bony deformity with mild puffiness, severe bony deformities bilateral finger joint mostly DIPs with flexion deformities few MCPs puffiness few tiny rheumatoid nodule more on the right, bilateral hips normal range of movement bilateral knees moderate valgus bony deformity mild heat puffiness no effusion, bilateral ankle moderate bony deformity mild puffiness left foot dressing, right severe bony deformities MTPs >> 1st MTPs  lateral drift dorsal few tiny rheumatoid nodules     CNS: Alert oriented person place and time, normal intact sensation, normal muscle strength   Extremities: No leg edema or cyanosis                     Result Review    Result  Review:  I have personally reviewed the results from the time of this admission to 8/1/2022 18:00 EDT and agree with these findings:  [x]  Laboratory  []  Microbiology  [x]  Radiology  []  EKG/Telemetry   []  Cardiology/Vascular   []  Pathology  []  Old records  []  Other:  Most notable findings include:     Left foot MRI    Assessment & Plan   Assessment / Plan     Brief Patient Summary:  Dank Johnson is a 61 y.o. male who   Chronic deforming rheumatoid arthritis, rheumatoid nodules  Moderate to severe osteoarthritic changes DIPs MTPs  Left foot toe cellulitis    Active Hospital Problems:  Active Hospital Problems    Diagnosis    • **Cellulitis and abscess of foot    • Rheumatoid arthritis involving multiple sites (HCC)        Plan:   At this point patient is on IV antibiotic for his left foot cellulitis due to severe chronic deformities chronic puffiness it is difficult to make the assessment for underlying active synovitis.  I explained to him in length about his current clinical status chronic rheumatoid arthritis without chronic treatment could put him high risk for early cardiovascular event.  During this hospitalization I do not have any recommendation I like to see him back after discharge before consideration of any treatment he will need up-to-date testing like TB testing x-ray and then decide further treatment plan.  I provided him my office contact information I will also try to make an appointment with me as an outpatient after the discharge he was understanding of my recommendation and concurred  treatment plan.      Electronically signed by Saman Neri MD, 08/01/22, 6:00 PM EDT.

## 2022-08-01 NOTE — SIGNIFICANT NOTE
[facility]   Wound Evaluation / Progress Note       Patient Name: Dank Johnson    : 1960    MRN: [id number]  Today's Date: 2022                     Admit Date: 2022    Visit Dx:    ICD-10-CM ICD-9-CM   1. Cellulitis and abscess of toe of left foot  L03.032 681.10    L02.612        Patient Active Problem List   Diagnosis   • Cellulitis and abscess of foot   • Rheumatoid arthritis involving multiple sites (HCC)        Past Medical History:   Diagnosis Date   • Arthritis         Past Surgical History:   Procedure Laterality Date   • EYE SURGERY Right     cornea transplant         Physical Assessment:  Wound 22 1240 Left anterior foot Laceration (Active)   Wound Image   22 1000   Dressing Appearance intact;moist drainage 22 1000   Closure None 22 2230   Base red;purple;necrotic;moist;slough 22 1000   Black (%), Wound Tissue Color 10 22 1000   Red (%), Wound Tissue Color 80 22 1000   Yellow (%), Wound Tissue Color 10 22 1000   Periwound intact;indurated;redness;warm 22 1000   Periwound Temperature warm 22 1000   Periwound Skin Turgor firm 22 1000   Edges open 22 1000   Wound Length (cm) 3 cm 22 1000   Wound Width (cm) 4 cm 22 1000   Wound Depth (cm) 0.2 cm 22 1000   Wound Surface Area (cm^2) 12 cm^2 22 1000   Wound Volume (cm^3) 2.4 cm^3 22 1000   Drainage Characteristics/Odor malodorous;serosanguineous 22 1000   Drainage Amount large 22 1000   Care, Wound irrigated with;sterile normal saline;antimicrobial agent applied 22 1000   Dressing Care dressing changed;packed with;packing strip;abdominal pad;gauze;elastic bandage 22 1000       Wound 22 1057 Left foot (Active)   Wound Image   22 1058   Dressing Appearance moist drainage;intact 22 1058   Closure None 22 1058   Base red;moist;bleeding 22 1058   Red (%), Wound Tissue Color 100 22  1058   Periwound intact;edematous;indurated;moist;redness;swelling 08/01/22 1058   Periwound Temperature warm 08/01/22 1058   Periwound Skin Turgor firm 08/01/22 1058   Edges open 08/01/22 1058   Wound Length (cm) 3 cm 08/01/22 1058   Wound Width (cm) 3.2 cm 08/01/22 1058   Wound Depth (cm) 2 cm 08/01/22 1058   Wound Surface Area (cm^2) 9.6 cm^2 08/01/22 1058   Wound Volume (cm^3) 19.2 cm^3 08/01/22 1058   Drainage Characteristics/Odor serosanguineous;malodorous 08/01/22 1058   Drainage Amount moderate 08/01/22 1058   Care, Wound sterile normal saline;cleansed with;antimicrobial agent applied 08/01/22 1058   Dressing Care dressing changed;packed with;packing strip;gauze;abdominal pad;elastic bandage 08/01/22 1058        Wound Check / Follow-up:  Seen today on 4 NT for wound RN eval. Open wound noted to left plantar foot measuring 3 cm x 3.2 cm x 2 cm. Wound irrigated with normal saline and base of wound red and moist with serosanguinous drainage. Caitlin-wound is indurated, red, and firm. Recommend twice daily dressing changes of packing strip moisten with 1/4 strength dakins and secured with gauze and gauze roll.    Wound noted to dorsal left foot below digit #2. Irrigated with normal saline. Wound base is noted to be 10% necrotic with 10% slough and 80% red and moist. Wound is noted to be malodorous with serosanguineous drainage. Caitlin-wound is indurated, red, and firm. Recommend twice daily dressing changes of packing strip moisten with 1/4 strength dakins and secured with gauze and gauze roll.    Impression: Traumatic wound to left plantar and dorsal foot.    Short term goals:  Regain skin integrity    Osmin Gonzales RN,[facility]    8/1/2022    11:00 EDT

## 2022-08-01 NOTE — PROGRESS NOTES
[facility]     Progress Note    Patient Name: Dank Johnson  : 1960  MRN: [id number]  Primary Care Physician:  Pardeep Le MD  Date of admission: 2022      Subjective   Brief summary.  Admitted with infection of the foot and toe      HPI:  Patient feeling tired and fatigued today.  Complains of pain.  No fever.  Unable to get up and walk.      Review of Systems     Drainage continues from the foot  No fever chills      Objective     Vitals:   Temp:  [97.3 °F (36.3 °C)-98.8 °F (37.1 °C)] 97.3 °F (36.3 °C)  Heart Rate:  [36-80] 73  Resp:  [16-20] 16  BP: (126-147)/(62-92) 144/81    Physical Exam :     Elderly male not in acute distress  Heart regular lungs clear  Lungs clear  Extensive degenerative changes of the multiple joints  Left foot without significant change    Result Review:  I have personally reviewed the results from the time of this admission to 2022 13:34 EDT and agree with these findings:  [x]  Laboratory  []  Microbiology  []  Radiology  []  EKG/Telemetry   []  Cardiology/Vascular   []  Pathology  []  Old records  []  Other:         MRI pending   Assessment / Plan       Active Hospital Problems:  Active Hospital Problems    Diagnosis    • **Cellulitis and abscess of foot    • Rheumatoid arthritis involving multiple sites (HCC)        Plan:   MRI negative for osteo-  Seen by podiatry  For wound care  Rheumatology consulted  Check labs    DVT prophylaxis:  Medical DVT prophylaxis orders are present.    CODE STATUS:   Code Status (Patient has no pulse and is not breathing): CPR (Attempt to Resuscitate)  Medical Interventions (Patient has pulse or is breathing): Full Support              Electronically signed by Bryant Rodriguez MD, 22, 1:36 PM EDT.

## 2022-08-01 NOTE — PLAN OF CARE
Goal Outcome Evaluation:  Plan of Care Reviewed With: patient           Outcome Evaluation: Patient remains alert and oriented x4. Medicated with PRN pain medication for L foot pain. Rested throughout the shift. Wound care performed by wound care nurse.

## 2022-08-02 PROCEDURE — 25010000002 ENOXAPARIN PER 10 MG: Performed by: INTERNAL MEDICINE

## 2022-08-02 PROCEDURE — 94799 UNLISTED PULMONARY SVC/PX: CPT

## 2022-08-02 PROCEDURE — 25010000002 VANCOMYCIN 5 G RECONSTITUTED SOLUTION: Performed by: INTERNAL MEDICINE

## 2022-08-02 PROCEDURE — 25010000002 SODIUM CHLORIDE 0.9 % WITH KCL 20 MEQ 20-0.9 MEQ/L-% SOLUTION: Performed by: INTERNAL MEDICINE

## 2022-08-02 PROCEDURE — 94760 N-INVAS EAR/PLS OXIMETRY 1: CPT

## 2022-08-02 PROCEDURE — 25010000002 PIPERACILLIN SOD-TAZOBACTAM PER 1 G: Performed by: INTERNAL MEDICINE

## 2022-08-02 PROCEDURE — 99024 POSTOP FOLLOW-UP VISIT: CPT | Performed by: PODIATRIST

## 2022-08-02 RX ADMIN — HYDROCODONE BITARTRATE AND ACETAMINOPHEN 1 TABLET: 10; 325 TABLET ORAL at 12:11

## 2022-08-02 RX ADMIN — Medication 473 ML: at 08:30

## 2022-08-02 RX ADMIN — TAZOBACTAM SODIUM AND PIPERACILLIN SODIUM 3.38 G: 375; 3 INJECTION, SOLUTION INTRAVENOUS at 17:07

## 2022-08-02 RX ADMIN — VANCOMYCIN HYDROCHLORIDE 2000 MG: 5 INJECTION, POWDER, LYOPHILIZED, FOR SOLUTION INTRAVENOUS at 16:31

## 2022-08-02 RX ADMIN — POTASSIUM CHLORIDE AND SODIUM CHLORIDE 50 ML/HR: 900; 150 INJECTION, SOLUTION INTRAVENOUS at 15:54

## 2022-08-02 RX ADMIN — Medication 473 ML: at 21:25

## 2022-08-02 RX ADMIN — HYDROCODONE BITARTRATE AND ACETAMINOPHEN 1 TABLET: 5; 325 TABLET ORAL at 01:06

## 2022-08-02 RX ADMIN — ENOXAPARIN SODIUM 40 MG: 100 INJECTION SUBCUTANEOUS at 08:29

## 2022-08-02 RX ADMIN — FAMOTIDINE 20 MG: 20 TABLET ORAL at 08:29

## 2022-08-02 RX ADMIN — TAZOBACTAM SODIUM AND PIPERACILLIN SODIUM 3.38 G: 375; 3 INJECTION, SOLUTION INTRAVENOUS at 08:29

## 2022-08-02 RX ADMIN — TAZOBACTAM SODIUM AND PIPERACILLIN SODIUM 3.38 G: 375; 3 INJECTION, SOLUTION INTRAVENOUS at 01:04

## 2022-08-02 RX ADMIN — TAZOBACTAM SODIUM AND PIPERACILLIN SODIUM 3.38 G: 375; 3 INJECTION, SOLUTION INTRAVENOUS at 23:51

## 2022-08-02 RX ADMIN — HYDROCODONE BITARTRATE AND ACETAMINOPHEN 1 TABLET: 10; 325 TABLET ORAL at 21:15

## 2022-08-02 RX ADMIN — HYDROCODONE BITARTRATE AND ACETAMINOPHEN 1 TABLET: 10; 325 TABLET ORAL at 04:12

## 2022-08-02 RX ADMIN — PREDNISOLONE ACETATE 2 DROP: 10 SUSPENSION/ DROPS OPHTHALMIC at 21:25

## 2022-08-02 RX ADMIN — PREDNISOLONE ACETATE 2 DROP: 10 SUSPENSION/ DROPS OPHTHALMIC at 16:31

## 2022-08-02 RX ADMIN — SENNOSIDES AND DOCUSATE SODIUM 2 TABLET: 50; 8.6 TABLET ORAL at 21:24

## 2022-08-02 NOTE — PROGRESS NOTES
[facility] - PODIATRY    Today's Date: 08/02/22    Patient Name: Dank Johnson  MRN: [id number]  CSN: [id number]  PCP: Pardeep Le MD  Referring Provider: Provider, No Known  Attending Provider: Bryant Rodriguez MD  Length of Stay: 3    SUBJECTIVE   Chief Complaint: Left forefoot ulceration    HPI: Dank Johnson, a 61 y.o.male,came to emergency room for left foot pain.  Patient has advanced  rheumatoid arthritis, joint deformities, he was standing on a ladder on Thursday and developed pain in his foot and subsequently noticed some drainage and ulcer on the foot in a hole on top of his left foot.  Patient reported to ER today as having difficulty with pain and drainage.  Work-up in the ER revealed extensive DJD changes and high white cell count along with high CRP.  Patient being admitted for ulcer of the foot and cellulitis.     Patient relates numbness in his feet.    INTERVAL UPDATE:    Patient states the left foot is feeling better.     Patient denies any fevers, chills, nausea, vomiting, shortness of breathe, nor any other constitutional signs nor symptoms.    Past Medical History:   Diagnosis Date   • Arthritis      Past Surgical History:   Procedure Laterality Date   • EYE SURGERY Right     cornea transplant     History reviewed. No pertinent family history.  Social History     Socioeconomic History   • Marital status:    Tobacco Use   • Smoking status: Current Every Day Smoker     Packs/day: 1.00     Types: Cigarettes     Start date: 1977   • Smokeless tobacco: Never Used   Substance and Sexual Activity   • Alcohol use: Yes     Comment: mayabe every 3 months   • Drug use: Not Currently     Comment: when he was younger   • Sexual activity: Defer     No Known Allergies  Current Facility-Administered Medications   Medication Dose Route Frequency Provider Last Rate Last Admin   • acetaminophen (TYLENOL) tablet 650 mg  650 mg Oral Q4H PRN Bryant Rodriguez MD        Or   • acetaminophen  (TYLENOL) 160 MG/5ML solution 650 mg  650 mg Oral Q4H PRN Bryant Rodriguez MD        Or   • acetaminophen (TYLENOL) suppository 650 mg  650 mg Rectal Q4H PRN Bryant Rodriguez MD       • sennosides-docusate (PERICOLACE) 8.6-50 MG per tablet 2 tablet  2 tablet Oral Nightly Bryant Rodriguez MD   2 tablet at 08/01/22 2027    And   • polyethylene glycol (MIRALAX) packet 17 g  17 g Oral Daily PRN Bryant Rodriguez MD        And   • bisacodyl (DULCOLAX) EC tablet 5 mg  5 mg Oral Daily PRN Bryant Rodriguez MD        And   • bisacodyl (DULCOLAX) suppository 10 mg  10 mg Rectal Daily PRN Bryant Rodriguez MD       • Enoxaparin Sodium (LOVENOX) syringe 40 mg  40 mg Subcutaneous Daily Bryant Rodriguez MD   40 mg at 08/02/22 0829   • famotidine (PEPCID) tablet 20 mg  20 mg Oral Daily Bryant Rodriguez MD   20 mg at 08/02/22 0829   • HYDROcodone-acetaminophen (NORCO)  MG per tablet 1 tablet  1 tablet Oral Q8H Bryant Rodriguez MD   1 tablet at 08/02/22 1211   • HYDROcodone-acetaminophen (NORCO) 5-325 MG per tablet 1 tablet  1 tablet Oral Q4H PRN Bryant Rodriguez MD   1 tablet at 08/02/22 0106   • HYDROmorphone (DILAUDID) injection 0.5 mg  0.5 mg Intravenous Q2H PRN Bryant Rodriguez MD   0.5 mg at 08/01/22 1006    And   • naloxone (NARCAN) injection 0.4 mg  0.4 mg Intravenous Q5 Min PRN Bryant Rodriguez MD       • ondansetron (ZOFRAN) injection 4 mg  4 mg Intravenous Q6H PRN Bryant Rodriguez MD       • Pharmacy to Dose enoxaparin (LOVENOX)   Does not apply Continuous PRN Bryant Rodriguez MD       • Pharmacy to Dose Zosyn   Does not apply Continuous PRN Bryant Rodriguez MD       • piperacillin-tazobactam (ZOSYN) 3.375 g in iso-osmotic dextrose 50 ml (premix)  3.375 g Intravenous Q8H Bryant Rodriguez MD   3.375 g at 08/02/22 0829   • prednisoLONE acetate (PRED FORTE) 1 % ophthalmic suspension 2 drop  2 drop Right Eye Q8H Pardeep Le MD   2 drop at 08/01/22 2126   • sodium chloride 0.9 % flush 10 mL  10 mL Intravenous PRN Bryant Rodriguez MD       • sodium chloride 0.9 % with KCl 20 mEq/L  infusion  50 mL/hr Intravenous Continuous Bryant Rodriguez MD 50 mL/hr at 22 1836 50 mL/hr at 22 1836   • Sodium Hypochlorite 0.062 % solution 473 mL  1 application Apply externally BID Bryant Rodriguez MD   473 mL at 22 0830     Review of Systems   Constitutional: Negative.    Skin:        Left forefoot ulcer   Neurological: Positive for numbness.   All other systems reviewed and are negative.      OBJECTIVE     Vitals:    22 1055   BP: 143/78   Pulse: (!) 44   Resp: 16   Temp: 97.7 °F (36.5 °C)   SpO2: 95%       PHYSICAL EXAM    GEN:   A&Ox3, NAD. Pt presents in hospital bed.     GEN:   A&Ox3, Patient seen at bedside in no apparent distress.     Physical Exam  Vitals and nursing note reviewed.   Constitutional:       General: He is not in acute distress.     Appearance: He is obese. He is ill-appearing.   Cardiovascular:      Pulses:           Dorsalis pedis pulses are 2+ on the right side and 2+ on the left side.        Posterior tibial pulses are 2+ on the right side and 2+ on the left side.   Musculoskeletal:      Right foot: Bunion present.      Left foot: Bunion present.   Feet:      Right foot:      Protective Sensation: 10 sites sensed.      Skin integrity: Warmth present.      Toenail Condition: Right toenails are normal.      Left foot:      Protective Sensation: 10 sites sensed.      Skin integrity: Ulcer, erythema and warmth present.      Toenail Condition: Left toenails are normal.            Foot/Ankle Exam:       General:   Appearance: obesity    Appearance comment:  Chronically ill  Orientation: AAOx3    Affect: appropriate       VASCULAR       Right Foot Vascularity   Normal vascular exam    Dorsalis pedis:  2+  Posterior tibial:  2+  Skin Temperature: warm    Edema Gradin+ and pitting  CFT:  < 3 seconds  Pedal Hair Growth:  Present  Varicosities: mild varicosities        Left Foot Vascularity   Normal vascular exam    Dorsalis pedis:  2+  Posterior tibial:  2+  Skin  Temperature: warm    Edema Gradin+ and pitting  CFT:  < 3 seconds  Pedal Hair Growth:  Present  Varicosities: mild varicosities        NEUROLOGIC      Right Foot Neurologic   Normal sensation    Light touch sensation:  Normal  Vibratory sensation:  Normal  Hot/Cold sensation: normal    Protective Sensation using [place] Monofilament:  10      Left Foot Neurologic   Normal sensation    Light touch sensation:  Normal  Vibratory sensation:  Normal  Hot/cold sensation: normal    Protective Sensation using [employer] [place] Monofilament:  10      MUSCULOSKELETAL       Right Foot Musculoskeletal   Hamm[place]oe:  Second toe, third toe, fourth toe and fifth toe  Hallux valgus: Yes        Left Foot Musculoskeletal   [place]amm[place]oe:  Second toe, third toe, fourth toe and fifth toe  Hallux valgus: Yes        MUSCLE STRENGTH      Right Foot Muscle Strength   Foot dorsiflexion:  4  Foot plantar flexion:  4  Foot inversion:  4  Foot eversion:  4      Left Foot Muscle Strength   Foot dorsiflexion:  4  Foot plantar flexion:  4  Foot inversion:  4  Foot eversion:  4      RANGE OF MOTION       Right Foot Range of Motion   Foot and ankle ROM within normal limits        Left Foot Range of Motion   Foot and ankle ROM within normal limits        DERMATOLOGIC      Right Foot Dermatologic   Skin: skin intact    Nails: normal        Left Foot Dermatologic   Skin: cellulitis, drainage, erythema and ulcer    Nails: normal        Left Foot Additional Comments: Left forefoot shows dressing dry intact no signs of drainage.  Ulceration site on plantar left first met tarsal head area discussed proper shoegear for the patient's feet and medical condition.  Patient states understanding and agreement with this plan.  Granular wound base with blood-tinged serous drainage.  Improvement in the edema and erythema in the forefoot.  No lymphangitis is seen.      LABORATORY/CULTURE RESULTS:  Results from last 7 days   Lab Units 22  1608  07/31/22  0624 07/30/22  1647   WBC 10*3/mm3 8.01 10.90* 12.50*   HEMOGLOBIN g/dL 14.4 15.0 14.9   HEMATOCRIT % 43.9 46.2 45.7   PLATELETS 10*3/mm3 270 273 237     Results from last 7 days   Lab Units 08/01/22  1108 07/31/22  0624 07/30/22  1647 07/30/22  1129   SODIUM mmol/L 136 134* 136 131*   POTASSIUM mmol/L 4.2 4.2 4.3 4.0   CHLORIDE mmol/L 102 100 100 97*   CO2 mmol/L 27.3 22.8 27.9 24.3   BUN mg/dL 14 13 10 11   CREATININE mg/dL 0.82 0.89 0.77 0.73*   CALCIUM mg/dL 8.8 8.9 8.8 8.8   BILIRUBIN mg/dL 0.9  --   --  0.9   ALK PHOS U/L 175*  --   --  175*   ALT (SGPT) U/L 16  --   --  18   AST (SGOT) U/L 15  --   --  22   GLUCOSE mg/dL 130* 115* 103* 176*         Microbiology Results (last 10 days)     Procedure Component Value - Date/Time    Wound Culture - Wound, Foot, Left [[id number]]  (Abnormal) Collected: 08/01/22 0710    Lab Status: Preliminary result Specimen: Wound from Foot, Left Updated: 08/02/22 0942     Wound Culture Moderate growth (3+) Staphylococcus aureus, MRSA     Comment: Methicillin resistant Staphylococcus aureus, Patient may be an isolation risk.         Moderate growth (3+) Normal Skin Kaycee     Gram Stain Many (4+) Gram negative bacilli      Moderate (3+) Gram positive cocci in pairs and chains    Blood Culture - Blood, Arm, Left [[id number]]  (Normal) Collected: 07/30/22 1454    Lab Status: Preliminary result Specimen: Blood from Arm, Left Updated: 08/01/22 1531     Blood Culture No growth at 2 days    Blood Culture - Blood, Arm, Left [[id number]]  (Normal) Collected: 07/30/22 1439    Lab Status: Preliminary result Specimen: Blood from Arm, Left Updated: 08/01/22 1502     Blood Culture No growth at 2 days           ASSESSMENT/PLAN     Active Hospital Problems:  Active Hospital Problems    Diagnosis    • **Cellulitis and abscess of foot    • Rheumatoid arthritis involving multiple sites (HCC)        Patient Active Problem List   Diagnosis   • Cellulitis and abscess of foot   • Rheumatoid  arthritis involving multiple sites (HCC)       Comprehensive lower extremity examination and evaluation was performed.    Discussed findings and treatment plan including risks, benefits, and treatment options with patient in detail. Patient agreed with treatment plan.    Upon review of Dr. Rodriguez's notes, the patient does not want to go to rehabilitation.    Continue current wound care.    Recommend the patient follow-up with the wound care center upon discharge.    Discussed with patient's nurse.    Thank you for including me in the care of this patient.      This document has been electronically signed by Abel German DPM on August 2, 2022 12:47 EDT

## 2022-08-02 NOTE — PROGRESS NOTES
"Pharmacy to Dose Vancomycin Day: 1    Dank Johnson is a 61 y.o.male admitted with left foot pain. Pharmacy has been consulted to dose IV Vancomycin     Consulting Provider: Jennifer  Clinical Indication: SSTI, MRSA  Pertinent Past Medical History: rheumatoid arthritis  Goal -600 mg/L.hr  Duration of therapy: TBD    177.8 cm (70\")       07/30/22  1058      Weight: 105 kg (231 lb 11.3 oz)         Estimated Creatinine Clearance: 117.7 mL/min (by C-G formula based on SCr of 0.82 mg/dL).  Results from last 7 days   Lab Units 08/01/22  1108 07/31/22  0624 07/30/22  1647 07/30/22  1129   BUN mg/dL 14 13 10 11   CREATININE mg/dL 0.82 0.89 0.77 0.73*       HD/PD/CRRT?: no    Lab Results   Component Value Date    WBC 8.01 08/01/2022      Temperature    08/02/22 0743 08/02/22 1055 08/02/22 1500   Temp: 97.7 °F (36.5 °C) 97.7 °F (36.5 °C) 97.7 °F (36.5 °C)        Contrast Administered: no    Relevant Micro:   Microbiology Results (last 10 days)       Procedure Component Value - Date/Time    Wound Culture - Wound, Foot, Left [[id number]]  (Abnormal) Collected: 08/01/22 0710    Lab Status: Preliminary result Specimen: Wound from Foot, Left Updated: 08/02/22 0942     Wound Culture Moderate growth (3+) Staphylococcus aureus, MRSA     Comment: Methicillin resistant Staphylococcus aureus, Patient may be an isolation risk.         Moderate growth (3+) Normal Skin Kaycee     Gram Stain Many (4+) Gram negative bacilli      Moderate (3+) Gram positive cocci in pairs and chains    Blood Culture - Blood, Arm, Left [[id number]]  (Normal) Collected: 07/30/22 1454    Lab Status: Preliminary result Specimen: Blood from Arm, Left Updated: 08/01/22 1531     Blood Culture No growth at 2 days    Blood Culture - Blood, Arm, Left [[id number]]  (Normal) Collected: 07/30/22 1439    Lab Status: Preliminary result Specimen: Blood from Arm, Left Updated: 08/02/22 1502     Blood Culture No growth at 3 days             Relevant Radiology:   Left Foot " MRI  IMPRESSION:  1. Generalized soft tissue edema/cellulitis.     2. Advanced degenerative changes in the MTP joints.  Suggest correlation with any history   inflammatory arthritis such as psoriatic arthritis or gout.     3. No evidence of abscess.  No convincing evidence of osteomyelitis.         Other Antimicrobial Therapy: Zosyn    Assessment/Plan  Loading dose: 2000 mg x1 8/2  Regimen: 1250 mg Q12H  Exposure Target: AUC24 (range) 400-600 mg/L.hr  AUC24,ss: 473 mg/L.hr  PAUC*: 68 %  Ctrough,ss: 14.8 mg/L  Pconc*: 25 %  Tox.: 10 %    Vanc levels (indicate peak trough and date/time)    Note: patient received dose of vancomycin 7/30 and no subsequent doses, Vancomycin IV started today 8/2 for MRSA in foot wound, recommend level to be drawn 8/4 AM    Labs ordered: YOBANY

## 2022-08-02 NOTE — PLAN OF CARE
Goal Outcome Evaluation:  Plan of Care Reviewed With: patient        Progress: no change  Outcome Evaluation: pt aox4, VS stable. medicated with scheduled norco but also required 1 dose of prn norco for increased pain. wound care performed as ordered.

## 2022-08-02 NOTE — PLAN OF CARE
Goal Outcome Evaluation:  Plan of Care Reviewed With: patient        Progress: no change  Outcome Evaluation: Patient alert and oriented, Vital signs stable. Dressing changed on left foot. Patient C/O pain 8/10 x1 today, relieved with PRN pain medications.

## 2022-08-02 NOTE — PROGRESS NOTES
[facility]     Progress Note    Patient Name: Dank Johnson  : 1960  MRN: [id number]  Primary Care Physician:  Pardeep Le MD  Date of admission: 2022      Subjective   Brief summary.  Patient admitted with foot infection      HPI:  Follow-up on foot infection continues to have drainage and pain.  Slightly better than yesterday.  Seen by podiatrist and rheumatologist.  Difficulty walking and putting pressure on the foot    Review of Systems     No fever chills  No nausea vomiting  Pain in the foot  Objective     Vitals:   Temp:  [97.2 °F (36.2 °C)-97.8 °F (36.6 °C)] 97.7 °F (36.5 °C)  Heart Rate:  [70-80] 80  Resp:  [16-18] 16  BP: (138-157)/(78-95) 157/78    Physical Exam :     Elderly male not in acute distress  Heart regular and lungs clear  Left foot in surgical dressing  No significant edema  Extensive DJD changes      Result Review:  I have personally reviewed the results from the time of this admission to 2022 09:34 EDT and agree with these findings:  [x]  Laboratory  []  Microbiology  []  Radiology  []  EKG/Telemetry   []  Cardiology/Vascular   []  Pathology  []  Old records  []  Other:           Assessment / Plan       Active Hospital Problems:  Active Hospital Problems    Diagnosis    • **Cellulitis and abscess of foot    • Rheumatoid arthritis involving multiple sites (HCC)        Plan:   Continue IV antibiotics.  Offered inpatient rehab but patient refused.  Reviewed notes from podiatry, no surgery planned.  Continue wound care.  Possible discharge home with home antibiotics       DVT prophylaxis:  Medical DVT prophylaxis orders are present.    CODE STATUS:   Code Status (Patient has no pulse and is not breathing): CPR (Attempt to Resuscitate)  Medical Interventions (Patient has pulse or is breathing): Full Support            Electronically signed by Bryant Rodriguez MD, 22, 9:34 AM EDT.

## 2022-08-02 NOTE — SIGNIFICANT NOTE
08/02/22 1659   Plan   Plan Followed up with patient on discharge needs. Patient  MD anticipated oral antibiotics at discharge. Patient  is able to drive car to appointments. Patient  lives with mother and has a neighbor that helps with dressing changes. Patient does not see a wound care specialist prior to admission. Patient  has all needed DME at home at this time. Patient curently recieving dressing changes twice a day with packing. Patient  is private and does not prefer to have HHC come to home but will accept if needed. Patient agreeable to have referrals placed to see which agency will accept his insurance. Referral placed to [facility].

## 2022-08-03 LAB
ANION GAP SERPL CALCULATED.3IONS-SCNC: 8 MMOL/L (ref 5–15)
BACTERIA SPEC AEROBE CULT: ABNORMAL
BACTERIA SPEC AEROBE CULT: ABNORMAL
BUN SERPL-MCNC: 13 MG/DL (ref 6–20)
BUN/CREAT SERPL: 14.8 (ref 7–25)
CALCIUM SPEC-SCNC: 8.8 MG/DL (ref 8.6–10.5)
CHLORIDE SERPL-SCNC: 102 MMOL/L (ref 98–107)
CO2 SERPL-SCNC: 26 MMOL/L (ref 22–29)
CREAT SERPL-MCNC: 0.88 MG/DL (ref 0.76–1.27)
EGFRCR SERPLBLD CKD-EPI 2021: 99.1 ML/MIN/1.73
GLUCOSE SERPL-MCNC: 102 MG/DL (ref 65–99)
GRAM STN SPEC: ABNORMAL
GRAM STN SPEC: ABNORMAL
POTASSIUM SERPL-SCNC: 4.4 MMOL/L (ref 3.5–5.2)
SODIUM SERPL-SCNC: 136 MMOL/L (ref 136–145)

## 2022-08-03 PROCEDURE — 25010000002 VANCOMYCIN 5 G RECONSTITUTED SOLUTION: Performed by: INTERNAL MEDICINE

## 2022-08-03 PROCEDURE — 25010000002 ENOXAPARIN PER 10 MG: Performed by: INTERNAL MEDICINE

## 2022-08-03 PROCEDURE — 25010000002 PIPERACILLIN SOD-TAZOBACTAM PER 1 G: Performed by: INTERNAL MEDICINE

## 2022-08-03 PROCEDURE — 25010000002 SODIUM CHLORIDE 0.9 % WITH KCL 20 MEQ 20-0.9 MEQ/L-% SOLUTION: Performed by: INTERNAL MEDICINE

## 2022-08-03 PROCEDURE — 94799 UNLISTED PULMONARY SVC/PX: CPT

## 2022-08-03 PROCEDURE — 80048 BASIC METABOLIC PNL TOTAL CA: CPT | Performed by: INTERNAL MEDICINE

## 2022-08-03 PROCEDURE — 94760 N-INVAS EAR/PLS OXIMETRY 1: CPT

## 2022-08-03 RX ADMIN — HYDROCODONE BITARTRATE AND ACETAMINOPHEN 1 TABLET: 10; 325 TABLET ORAL at 05:00

## 2022-08-03 RX ADMIN — HYDROCODONE BITARTRATE AND ACETAMINOPHEN 1 TABLET: 10; 325 TABLET ORAL at 13:15

## 2022-08-03 RX ADMIN — PREDNISOLONE ACETATE 2 DROP: 10 SUSPENSION/ DROPS OPHTHALMIC at 23:42

## 2022-08-03 RX ADMIN — POTASSIUM CHLORIDE AND SODIUM CHLORIDE 50 ML/HR: 900; 150 INJECTION, SOLUTION INTRAVENOUS at 16:44

## 2022-08-03 RX ADMIN — FAMOTIDINE 20 MG: 20 TABLET ORAL at 08:39

## 2022-08-03 RX ADMIN — ENOXAPARIN SODIUM 40 MG: 100 INJECTION SUBCUTANEOUS at 08:39

## 2022-08-03 RX ADMIN — Medication 473 ML: at 08:47

## 2022-08-03 RX ADMIN — PREDNISOLONE ACETATE 2 DROP: 10 SUSPENSION/ DROPS OPHTHALMIC at 05:21

## 2022-08-03 RX ADMIN — TAZOBACTAM SODIUM AND PIPERACILLIN SODIUM 3.38 G: 375; 3 INJECTION, SOLUTION INTRAVENOUS at 23:42

## 2022-08-03 RX ADMIN — Medication 10 ML: at 08:39

## 2022-08-03 RX ADMIN — TAZOBACTAM SODIUM AND PIPERACILLIN SODIUM 3.38 G: 375; 3 INJECTION, SOLUTION INTRAVENOUS at 16:44

## 2022-08-03 RX ADMIN — VANCOMYCIN HYDROCHLORIDE 1250 MG: 5 INJECTION, POWDER, LYOPHILIZED, FOR SOLUTION INTRAVENOUS at 05:00

## 2022-08-03 RX ADMIN — TAZOBACTAM SODIUM AND PIPERACILLIN SODIUM 3.38 G: 375; 3 INJECTION, SOLUTION INTRAVENOUS at 08:39

## 2022-08-03 RX ADMIN — HYDROCODONE BITARTRATE AND ACETAMINOPHEN 1 TABLET: 10; 325 TABLET ORAL at 20:21

## 2022-08-03 RX ADMIN — PREDNISOLONE ACETATE 2 DROP: 10 SUSPENSION/ DROPS OPHTHALMIC at 13:16

## 2022-08-03 RX ADMIN — VANCOMYCIN HYDROCHLORIDE 1250 MG: 5 INJECTION, POWDER, LYOPHILIZED, FOR SOLUTION INTRAVENOUS at 16:44

## 2022-08-03 RX ADMIN — SENNOSIDES AND DOCUSATE SODIUM 2 TABLET: 50; 8.6 TABLET ORAL at 20:21

## 2022-08-03 RX ADMIN — Medication 473 ML: at 20:25

## 2022-08-03 NOTE — PLAN OF CARE
Goal Outcome Evaluation:              Outcome Evaluation: pain managed by routine pain med. pt made aware of prn pain mgmt if needed. denies nauses. wound care done per orders. no new issues/needs noted at this time.

## 2022-08-03 NOTE — CONSULTS
"Nutrition Services    Patient Name: Dank Johnson  YOB: 1960  MRN: [id number]  Admission date: 7/30/2022      CLINICAL NUTRITION ASSESSMENT      Reason for Assessment  Nonhealing wound or pressure ulcer   H&P:    Past Medical History:   Diagnosis Date   • Arthritis         Current Problems:   Active Hospital Problems    Diagnosis    • **Cellulitis and abscess of foot    • Rheumatoid arthritis involving multiple sites (HCC)         Nutrition/Diet History         Narrative     RD consult for non-healing wound.  Pt with traumatic wound injury to left anterior foot.  Pt unsure of what caused the wound.  RD explained role of good nutrition for wound healing, focusing on good protein sources which were discussed.  Pt eating well % all meals since admission.  Pt agreeable to try Bean BID for arginine & wound healing.     Anthropometrics        Current Height, Weight Height: 177.8 cm (70\")  Weight: 105 kg (231 lb 11.3 oz)   Current BMI Body mass index is 33.25 kg/m².       Weight Hx  Wt Readings from Last 30 Encounters:   07/30/22 1058 105 kg (231 lb 11.3 oz)              Estimated/Assessed Needs       Energy Requirements    EST Needs (kcal/day) 2246       Protein Requirements    EST Daily Needs (g/day) 126-158       Fluid Requirements     Estimated Needs (mL/day) 2246     Labs/Medications         Pertinent Labs Reviewed.   Results from last 7 days   Lab Units 08/03/22  0553 08/01/22  1108 07/31/22  0624 07/30/22  1647 07/30/22  1129   SODIUM mmol/L 136 136 134*   < > 131*   POTASSIUM mmol/L 4.4 4.2 4.2   < > 4.0   CHLORIDE mmol/L 102 102 100   < > 97*   CO2 mmol/L 26.0 27.3 22.8   < > 24.3   BUN mg/dL 13 14 13   < > 11   CREATININE mg/dL 0.88 0.82 0.89   < > 0.73*   CALCIUM mg/dL 8.8 8.8 8.9   < > 8.8   BILIRUBIN mg/dL  --  0.9  --   --  0.9   ALK PHOS U/L  --  175*  --   --  175*   ALT (SGPT) U/L  --  16  --   --  18   AST (SGOT) U/L  --  15  --   --  22   GLUCOSE mg/dL 102* 130* 115*   < > 176*    < " > = values in this interval not displayed.     Results from last 7 days   Lab Units 08/01/22  1108   HEMOGLOBIN g/dL 14.4   HEMATOCRIT % 43.9       Pertinent Medications Reviewed.     Current Nutrition Orders & Evaluation of Intake       Oral Nutrition     Current PO Diet Diet Regular; Cardiac   Supplement No active supplement orders       Nutrition Diagnosis         Nutrition Dx Problem 1 Increased nutrient needs related to wound healing as evidenced by Left food traumatic injury, estimated protein needs     Nutrition Intervention         Continue cardiac diet  Discussed role of good protein sources in diet  Add Bean BID     Medical Nutrition Therapy/Nutrition Education          Learner     Readiness Patient  Acceptance     Method     Response Explanation  Verbalizes understanding     Monitor/Evaluation        Monitor PO intake, Supplement intake, Pertinent labs, Skin status     Nutrition Discharge Plan         high protein diet, continue Bean BID-discussed where can be purchased     Electronically signed by:  Mikayla Guzmán RD  08/03/22 11:50 EDT

## 2022-08-03 NOTE — PROGRESS NOTES
[facility]     Progress Note    Patient Name: Dank Johnson  : 1960  MRN: [id number]  Primary Care Physician:  Pardeep Le MD  Date of admission: 2022      Subjective   Brief summary.  Patient admitted with foot infection      HPI:  Follow-up on foot infection   Seen by podiatrist and rheumatologist.  No surgery planned  Difficulty walking and putting pressure on the foot    Review of Systems     No fever chills  Pain in the foot, difficulty walking  Objective     Vitals:   Temp:  [97.3 °F (36.3 °C)-98.4 °F (36.9 °C)] 97.3 °F (36.3 °C)  Heart Rate:  [61-82] 74  Resp:  [16-20] 20  BP: (136-154)/(73-98) 154/89    Physical Exam :     Elderly male not in acute distress  Heart regular.  Lungs clear bilaterally  Left foot in surgical dressing  No significant edema  Extensive DJD changes both upper extremities and lower extremities      Result Review:  I have personally reviewed the results from the time of this admission to 8/3/2022 13:37 EDT and agree with these findings:  [x]  Laboratory  []  Microbiology  []  Radiology  []  EKG/Telemetry   []  Cardiology/Vascular   []  Pathology  []  Old records  []  Other:           Assessment / Plan       Active Hospital Problems:  Active Hospital Problems    Diagnosis    • **Cellulitis and abscess of foot    • Rheumatoid arthritis involving multiple sites (HCC)        Plan:   Continue IV antibiotics.  And vancomycin.  PT OT to see patient's mobility.  .  Possible discharge home with home antibiotics       DVT prophylaxis:  Medical DVT prophylaxis orders are present.    CODE STATUS:   Code Status (Patient has no pulse and is not breathing): CPR (Attempt to Resuscitate)  Medical Interventions (Patient has pulse or is breathing): Full Support          Electronically signed by Bryant Rodriguez MD, 22, 1:38 PM EDT.

## 2022-08-04 ENCOUNTER — READMISSION MANAGEMENT (OUTPATIENT)
Dept: CALL CENTER | Facility: HOSPITAL | Age: 62
End: 2022-08-04

## 2022-08-04 VITALS
BODY MASS INDEX: 28.89 KG/M2 | DIASTOLIC BLOOD PRESSURE: 92 MMHG | SYSTOLIC BLOOD PRESSURE: 152 MMHG | RESPIRATION RATE: 22 BRPM | OXYGEN SATURATION: 97 % | TEMPERATURE: 98.1 F | HEART RATE: 68 BPM | WEIGHT: 184.08 LBS | HEIGHT: 67 IN

## 2022-08-04 PROBLEM — L02.612 CELLULITIS AND ABSCESS OF TOE OF LEFT FOOT: Status: ACTIVE | Noted: 2022-07-30

## 2022-08-04 PROBLEM — L03.032 CELLULITIS AND ABSCESS OF TOE OF LEFT FOOT: Status: ACTIVE | Noted: 2022-07-30

## 2022-08-04 LAB
ANION GAP SERPL CALCULATED.3IONS-SCNC: 8.4 MMOL/L (ref 5–15)
BACTERIA SPEC AEROBE CULT: NORMAL
BACTERIA SPEC AEROBE CULT: NORMAL
BUN SERPL-MCNC: 19 MG/DL (ref 6–20)
BUN/CREAT SERPL: 21.6 (ref 7–25)
CALCIUM SPEC-SCNC: 9.6 MG/DL (ref 8.6–10.5)
CHLORIDE SERPL-SCNC: 99 MMOL/L (ref 98–107)
CO2 SERPL-SCNC: 25.6 MMOL/L (ref 22–29)
CREAT SERPL-MCNC: 0.88 MG/DL (ref 0.76–1.27)
EGFRCR SERPLBLD CKD-EPI 2021: 99.1 ML/MIN/1.73
GLUCOSE SERPL-MCNC: 113 MG/DL (ref 65–99)
POTASSIUM SERPL-SCNC: 4.3 MMOL/L (ref 3.5–5.2)
SODIUM SERPL-SCNC: 133 MMOL/L (ref 136–145)
VANCOMYCIN TROUGH SERPL-MCNC: 15.77 MCG/ML (ref 5–20)

## 2022-08-04 PROCEDURE — 97165 OT EVAL LOW COMPLEX 30 MIN: CPT

## 2022-08-04 PROCEDURE — 97161 PT EVAL LOW COMPLEX 20 MIN: CPT

## 2022-08-04 PROCEDURE — 25010000002 DAPTOMYCIN PER 1 MG: Performed by: INTERNAL MEDICINE

## 2022-08-04 PROCEDURE — 02HV33Z INSERTION OF INFUSION DEVICE INTO SUPERIOR VENA CAVA, PERCUTANEOUS APPROACH: ICD-10-PCS | Performed by: INTERNAL MEDICINE

## 2022-08-04 PROCEDURE — C1751 CATH, INF, PER/CENT/MIDLINE: HCPCS

## 2022-08-04 PROCEDURE — 80202 ASSAY OF VANCOMYCIN: CPT | Performed by: INTERNAL MEDICINE

## 2022-08-04 PROCEDURE — 05HA33Z INSERTION OF INFUSION DEVICE INTO LEFT BRACHIAL VEIN, PERCUTANEOUS APPROACH: ICD-10-PCS | Performed by: INTERNAL MEDICINE

## 2022-08-04 PROCEDURE — 25010000002 VANCOMYCIN 5 G RECONSTITUTED SOLUTION: Performed by: INTERNAL MEDICINE

## 2022-08-04 PROCEDURE — 80048 BASIC METABOLIC PNL TOTAL CA: CPT | Performed by: INTERNAL MEDICINE

## 2022-08-04 PROCEDURE — 25010000002 PIPERACILLIN SOD-TAZOBACTAM PER 1 G: Performed by: INTERNAL MEDICINE

## 2022-08-04 PROCEDURE — 25010000002 ENOXAPARIN PER 10 MG: Performed by: INTERNAL MEDICINE

## 2022-08-04 PROCEDURE — 94799 UNLISTED PULMONARY SVC/PX: CPT

## 2022-08-04 RX ORDER — BISACODYL 10 MG
10 SUPPOSITORY, RECTAL RECTAL DAILY PRN
Qty: 10 SUPPOSITORY | Refills: 0 | Status: SHIPPED | OUTPATIENT
Start: 2022-08-04 | End: 2022-09-03

## 2022-08-04 RX ORDER — BISACODYL 5 MG/1
5 TABLET, DELAYED RELEASE ORAL DAILY PRN
Qty: 30 TABLET | Refills: 0 | Status: SHIPPED | OUTPATIENT
Start: 2022-08-04 | End: 2022-09-03

## 2022-08-04 RX ORDER — FAMOTIDINE 20 MG/1
20 TABLET, FILM COATED ORAL DAILY
Qty: 30 TABLET | Refills: 0 | Status: SHIPPED | OUTPATIENT
Start: 2022-08-05 | End: 2022-09-04

## 2022-08-04 RX ORDER — PREDNISOLONE ACETATE 10 MG/ML
2 SUSPENSION/ DROPS OPHTHALMIC EVERY 8 HOURS SCHEDULED
Qty: 9 ML | Refills: 0 | Status: SHIPPED | OUTPATIENT
Start: 2022-08-04 | End: 2022-09-03

## 2022-08-04 RX ORDER — SODIUM CHLORIDE 0.9 % (FLUSH) 0.9 %
10 SYRINGE (ML) INJECTION AS NEEDED
Status: DISCONTINUED | OUTPATIENT
Start: 2022-08-04 | End: 2022-08-04 | Stop reason: HOSPADM

## 2022-08-04 RX ORDER — HYDROCODONE BITARTRATE AND ACETAMINOPHEN 5; 325 MG/1; MG/1
1 TABLET ORAL EVERY 6 HOURS PRN
Qty: 100 TABLET | Refills: 0 | Status: SHIPPED | OUTPATIENT
Start: 2022-08-04 | End: 2022-09-03

## 2022-08-04 RX ORDER — HYDRALAZINE HYDROCHLORIDE 10 MG/1
10 TABLET, FILM COATED ORAL ONCE
Status: COMPLETED | OUTPATIENT
Start: 2022-08-04 | End: 2022-08-04

## 2022-08-04 RX ORDER — POLYETHYLENE GLYCOL 3350 17 G/17G
17 POWDER, FOR SOLUTION ORAL DAILY PRN
Qty: 30 PACKET | Refills: 0 | Status: SHIPPED | OUTPATIENT
Start: 2022-08-04 | End: 2022-09-03

## 2022-08-04 RX ORDER — AMOXICILLIN 250 MG
2 CAPSULE ORAL NIGHTLY
Qty: 60 TABLET | Refills: 0 | Status: SHIPPED | OUTPATIENT
Start: 2022-08-04 | End: 2022-09-03

## 2022-08-04 RX ORDER — SODIUM CHLORIDE 0.9 % (FLUSH) 0.9 %
20 SYRINGE (ML) INJECTION AS NEEDED
Status: DISCONTINUED | OUTPATIENT
Start: 2022-08-04 | End: 2022-08-04 | Stop reason: HOSPADM

## 2022-08-04 RX ADMIN — HYDROCODONE BITARTRATE AND ACETAMINOPHEN 1 TABLET: 10; 325 TABLET ORAL at 05:58

## 2022-08-04 RX ADMIN — PREDNISOLONE ACETATE 2 DROP: 10 SUSPENSION/ DROPS OPHTHALMIC at 16:47

## 2022-08-04 RX ADMIN — FAMOTIDINE 20 MG: 20 TABLET ORAL at 08:59

## 2022-08-04 RX ADMIN — DAPTOMYCIN 450 MG: 500 INJECTION, POWDER, FOR SOLUTION INTRAVENOUS at 17:18

## 2022-08-04 RX ADMIN — PREDNISOLONE ACETATE 2 DROP: 10 SUSPENSION/ DROPS OPHTHALMIC at 05:58

## 2022-08-04 RX ADMIN — HYDRALAZINE HYDROCHLORIDE 10 MG: 10 TABLET, FILM COATED ORAL at 18:19

## 2022-08-04 RX ADMIN — Medication 473 ML: at 09:00

## 2022-08-04 RX ADMIN — ENOXAPARIN SODIUM 40 MG: 100 INJECTION SUBCUTANEOUS at 08:59

## 2022-08-04 RX ADMIN — HYDROCODONE BITARTRATE AND ACETAMINOPHEN 1 TABLET: 10; 325 TABLET ORAL at 16:11

## 2022-08-04 RX ADMIN — TAZOBACTAM SODIUM AND PIPERACILLIN SODIUM 3.38 G: 375; 3 INJECTION, SOLUTION INTRAVENOUS at 08:59

## 2022-08-04 RX ADMIN — VANCOMYCIN HYDROCHLORIDE 1250 MG: 5 INJECTION, POWDER, LYOPHILIZED, FOR SOLUTION INTRAVENOUS at 05:57

## 2022-08-04 NOTE — THERAPY EVALUATION
Patient Name: Dank Johnson  : 1960    MRN: [id number]                              Today's Date: 2022       Admit Date: 2022    Visit Dx:     ICD-10-CM ICD-9-CM   1. Cellulitis and abscess of toe of left foot  L03.032 681.10    L02.612    2. Difficulty walking  R26.2 719.7   3. Decreased activities of daily living (ADL)  Z78.9 V49.89     Patient Active Problem List   Diagnosis   • Cellulitis and abscess of toe of left foot   • Rheumatoid arthritis involving multiple sites (HCC)     Past Medical History:   Diagnosis Date   • Arthritis      Past Surgical History:   Procedure Laterality Date   • EYE SURGERY Right     cornea transplant      General Information     Row Name 22 1303          OT Time and Intention    Document Type evaluation  -AV     Mode of Treatment individual therapy;occupational therapy  -AV     Row Name 22 1303          General Information    Patient Profile Reviewed yes  -AV     Prior Level of Function independent:;ADL's;home management;all household mobility  stands to shower (tub). stands at sink to groom. regular commode. ambulates without assistive device. drives.no home O2.  -AV     Existing Precautions/Restrictions fall  -AV     Barriers to Rehab none identified  -AV     Row Name 22 1303          Occupational Profile    Reason for Services/Referral (Occupational Profile) Patient is a 61 year old male admitted to [facility] on 2022 with left foot pain/ infection. He is currently on 4NT/ room air. OT consulted due to recent decline in ADL/transfer (I). No previous OT services for current condition.  -AV     Row Name 22 1303          Living Environment    People in Home parent(s)  -AV     Row Name 22 1303          Home Main Entrance    Number of Stairs, Main Entrance none  ramp  -AV     Row Name 22 1303          Cognition    Orientation Status (Cognition) --  alert, pleasant and cooperative. able to retain information and follow  commands.  -AV     Row Name 08/04/22 1303          Safety Issues, Functional Mobility    Impairments Affecting Function (Mobility) balance;endurance/activity tolerance  -AV           User Key  (r) = Recorded By, (t) = Taken By, (c) = Cosigned By    Initials Name Provider Type    Darwin Alejo OT Occupational Therapist                 Mobility/ADL's     Row Name 08/04/22 1306          Transfers    Comment, (Transfers) CGA  -AV     Row Name 08/04/22 1306          Activities of Daily Living    BADL Assessment/Intervention --  (I) feeding and grooming with setup while seated. CGA/min assist bathing/dressing. (I) urinal/ CGA further toilet hygiene (BSC).  -AV           User Key  (r) = Recorded By, (t) = Taken By, (c) = Cosigned By    Initials Name Provider Type    Darwin Alejo OT Occupational Therapist               Obj/Interventions     Row Name 08/04/22 1306          Sensory Assessment (Somatosensory)    Sensory Assessment (Somatosensory) UE sensation intact  -AV     Row Name 08/04/22 1306          Vision Assessment/Intervention    Visual Impairment/Limitations WFL  history of cornea transplant. decreased right prior.  -AV     Row Name 08/04/22 1306          Range of Motion Comprehensive    General Range of Motion upper extremity range of motion deficits identified  -AV     Comment, General Range of Motion arthritic hands  -AV     Row Name 08/04/22 1306          Strength Comprehensive (MMT)    Comment, General Manual Muscle Testing (MMT) Assessment 4/5 bilateral biceps and triceps  -AV     Row Name 08/04/22 1306          Motor Skills    Motor Skills coordination;functional endurance  -AV     Coordination --  right dominant- some difficulty prior  -AV     Functional Endurance fair minus  -AV     Row Name 08/04/22 1306          Balance    Comment, Balance impaired  -AV           User Key  (r) = Recorded By, (t) = Taken By, (c) = Cosigned By    Initials Name Provider Type    Darwin Alejo OT Occupational  Therapist               Goals/Plan     Row Name 08/04/22 1309          Transfer Goal 1 (OT)    Activity/Assistive Device (Transfer Goal 1, OT) transfers, all;walker, rolling  -AV     [place] Level/Cues Needed (Transfer Goal 1, OT) modified independence  -AV     Time Frame (Transfer Goal 1, OT) long term goal (LTG);10 days  -AV     Row Name 08/04/22 1309          Bathing Goal 1 (OT)    Activity/Device (Bathing Goal 1, OT) bathing skills, all;tub bench  -AV     [place] Level/Cues Needed (Bathing Goal 1, OT) modified independence  -AV     Time Frame (Bathing Goal 1, OT) long term goal (LTG);10 days  -AV     Row Name 08/04/22 1309          Dressing Goal 1 (OT)    Activity/Device (Dressing Goal 1, OT) dressing skills, all  -AV     [place]/Cues Needed (Dressing Goal 1, OT) modified independence  -AV     Time Frame (Dressing Goal 1, OT) long term goal (LTG);10 days  -AV     Row Name 08/04/22 1309          Toileting Goal 1 (OT)    Activity/Device (Toileting Goal 1, OT) toileting skills, all;raised toilet seat  -AV     [place] Level/Cues Needed (Toileting Goal 1, OT) modified independence  -AV     Time Frame (Toileting Goal 1, OT) long term goal (LTG);10 days  -AV     Row Name 08/04/22 1309          Grooming Goal 1 (OT)    Activity/Device (Grooming Goal 1, OT) grooming skills, all  -AV     [place] (Grooming Goal 1, OT) modified independence  standing at sink  -AV     Time Frame (Grooming Goal 1, OT) long term goal (LTG);10 days  -AV     Row Name 08/04/22 1309          Problem Specific Goal 1 (OT)    Problem Specific Goal 1 (OT) Patient will demonstrate fair plus endurance/ activity tolerance needed to support ADLs.  -AV     Time Frame (Problem Specific Goal 1, OT) long term goal (LTG);10 days  -AV     Row Name 08/04/22 1309          Therapy Assessment/Plan (OT)    Planned Therapy Interventions (OT) activity tolerance training;BADL retraining;functional balance retraining;IADL  retraining;occupation/activity based interventions;patient/caregiver education/training;transfer/mobility retraining  -AV           User Key  (r) = Recorded By, (t) = Taken By, (c) = Cosigned By    Initials Name Provider Type    Darwin Alejo OT Occupational Therapist               Clinical Impression     Row Name 08/04/22 1308          Pain Assessment    Additional Documentation Pain Scale: FACES Pre/Post-Treatment (Group)  -AV     Row Name 08/04/22 1308          Pain Scale: FACES Pre/Post-Treatment    Pain: FACES Scale, Pretreatment 2-->hurts little bit  -AV     Posttreatment Pain Rating 2-->hurts little bit  -AV     Row Name 08/04/22 1308          Plan of Care Review    Plan of Care Reviewed With patient  -AV     Progress no change  first session for eval  -AV     Outcome Evaluation Patient presents with limitations of balance and endurance/ activity tolerance impacting ADL/transfer (I). OT is needed to remediate/compensate for deficits to maximize (I).  -AV     Row Name 08/04/22 1300          Therapy Assessment/Plan (OT)    Patient/Family Therapy Goal Statement (OT) regain (I)  -AV     Rehab Potential (OT) good, to achieve stated therapy goals  -AV     Criteria for Skilled Therapeutic Interventions Met (OT) yes;meets criteria;skilled treatment is necessary  -AV     Therapy Frequency (OT) 5 times/wk  -AV     Row Name 08/04/22 1308          Therapy Plan Review/Discharge Plan (OT)    Equipment Needs Upon Discharge (OT) walker, rolling;commode chair;tub bench  -AV     Anticipated Discharge Disposition (OT) home with assist;home with home health  -AV     Row Name 08/04/22 1307          Vital Signs    O2 Delivery Pre Treatment room air  -AV     O2 Delivery Intra Treatment room air  -AV     O2 Delivery Post Treatment room air  -AV           User Key  (r) = Recorded By, (t) = Taken By, (c) = Cosigned By    Initials Name Provider Type    Darwin Alejo OT Occupational Therapist               Outcome Measures      Row Name 08/04/22 1312          How much help from another is currently needed...    Putting on and taking off regular lower body clothing? 3  -AV     Bathing (including washing, rinsing, and drying) 3  -AV     Toileting (which includes using toilet bed pan or urinal) 3  -AV     Putting on and taking off regular upper body clothing 4  -AV     Taking care of personal grooming (such as brushing teeth) 4  -AV     Eating meals 4  -AV     AM-PAC 6 Clicks Score (OT) 21  -AV     Row Name 08/04/22 1300 08/04/22 0909       How much help from another person do you currently need...    Turning from your back to your side while in flat bed without using bedrails? 4  -CS 4  -BB    Moving from lying on back to sitting on the side of a flat bed without bedrails? 4  -CS 4  -BB    Moving to and from a bed to a chair (including a wheelchair)? 4  -CS 3  -BB    Standing up from a chair using your arms (e.g., wheelchair, bedside chair)? 4  -CS 4  -BB    Climbing 3-5 steps with a railing? 3  -CS 2  -BB    To walk in hospital room? 4  -CS 3  -BB    AM-PAC 6 Clicks Score (PT) 23  -CS 20  -BB    Highest level of mobility 7 --> Walked 25 feet or more  -CS 6 --> Walked 10 steps or more  -BB    Row Name 08/04/22 1312 08/04/22 1300       Functional Assessment    Outcome Measure Options AM-PAC 6 Clicks Daily Activity (OT);Optimal Instrument  -AV AM-PAC 6 Clicks Basic Mobility (PT)  -    Row Name 08/04/22 1312          Optimal Instrument    Optimal Instrument Optimal - 3  -AV     Bending/Stooping 2  -AV     Standing 2  -AV     Reaching 1  -AV     From the list, choose the 3 activities you would most like to be able to do without any difficulty Bending/stooping;Standing;Reaching  -AV     Total Score Optimal - 3 5  -AV           User Key  (r) = Recorded By, (t) = Taken By, (c) = Cosigned By    Initials Name Provider Type    BB Nancy Khan, RN Registered Nurse    AV Darwin Chao, OT Occupational Therapist    CS Lou Coreas, PT Physical  Therapist                Occupational Therapy Education                 Title: PT OT SLP Therapies (In Progress)     Topic: Occupational Therapy (Done)     Point: ADL training (Done)     Description:   Instruct learner(s) on proper safety adaptation and remediation techniques during self care or transfers.   Instruct in proper use of assistive devices.              Learning Progress Summary           Patient Acceptance, E, VU by AKASH at 8/4/2022 1313                               User Key     Initials Effective Dates Name Provider Type Discipline     06/16/21 -  Darwin Chao OT Occupational Therapist OT              OT Recommendation and Plan  Planned Therapy Interventions (OT): activity tolerance training, BADL retraining, functional balance retraining, IADL retraining, occupation/activity based interventions, patient/caregiver education/training, transfer/mobility retraining  Therapy Frequency (OT): 5 times/wk  Plan of Care Review  Plan of Care Reviewed With: patient  Progress: no change (first session for eval)  Outcome Evaluation: Patient presents with limitations of balance and endurance/ activity tolerance impacting ADL/transfer (I). OT is needed to remediate/compensate for deficits to maximize (I).     Time Calculation:    Time Calculation- OT     Row Name 08/04/22 1313             Time Calculation- OT    OT Received On 08/04/22  -AV      OT Goal Re-Cert Due Date 08/13/22  -AV              Untimed Charges    OT Eval/Re-eval Minutes 35  -AV              Total Minutes    Untimed Charges Total Minutes 35  -AV       Total Minutes 35  -AV            User Key  (r) = Recorded By, (t) = Taken By, (c) = Cosigned By    Initials Name Provider Type    AV Darwin Chao OT Occupational Therapist              Therapy Charges for Today     Code Description Service Date Service Provider Modifiers Qty    [id number]  OT EVAL LOW COMPLEXITY 3 8/4/2022 Darwin Chao OT GO 1               Darwin Chao OT  8/4/2022

## 2022-08-04 NOTE — CONSULTS
Procedure: Insertion of Peripherally Inserted Central Catheter    Indications:  Long Term IV therapy, Home IV therapy    Procedure Details   Education, Including what a PICC line is, why it is used, how it is placed, and how to manage and care for the PICC line was completed. Patient's questions and concerns were answered and addressed. Risks of PICC lines, during and after insertion, were discussed, and informed consent was obtained for the procedure.      An active time out was conducted with patient's nurse using 2 patient identifiers.    After using ultrasound to identify an appropriate vein, maximum sterile technique was used including antiseptics, cap, gloves, gown, hand hygiene, mask and sheet.    #4 FR/18G PICC inserted to the left brachial vein per hospital protocol.   Blood return:  yes    Findings:  Catheter inserted to 47 cm, with 0 cm. Exposed. Mid upper arm circumference is 34 cm.   Catheter was flushed with 20 cc NS. Patient did tolerate procedure well.    Recommendations:  3CG technology used to confirm placement,   PICC Brochure given to patient with teaching instruction.    Giovanni Vásquez RN

## 2022-08-04 NOTE — THERAPY EVALUATION
Acute Care - Physical Therapy Initial Evaluation   Jeov[facility]     Patient Name: Dank Johnson  : 1960  MRN: [id number]  Today's Date: 2022      Visit Dx:     ICD-10-CM ICD-9-CM   1. Cellulitis and abscess of toe of left foot  L03.032 681.10    L02.612    2. Difficulty walking  R26.2 719.7   3. Decreased activities of daily living (ADL)  Z78.9 V49.89     Patient Active Problem List   Diagnosis   • Cellulitis and abscess of toe of left foot   • Rheumatoid arthritis involving multiple sites (HCC)     Past Medical History:   Diagnosis Date   • Arthritis      Past Surgical History:   Procedure Laterality Date   • EYE SURGERY Right     cornea transplant     PT Assessment (last 12 hours)     PT Evaluation and Treatment     Row Name 22 1300          Physical Therapy Time and Intention    Subjective Information no complaints  -CS     Document Type evaluation  -CS     Mode of Treatment individual therapy;physical therapy  -CS     Patient Effort good  -CS     Row Name 22 1300          General Information    Patient Profile Reviewed yes  -CS     Patient Observations alert;cooperative;agree to therapy  -CS     Prior Level of Function independent:;all household mobility;community mobility;gait;transfer;bed mobility;ADL's  -CS     Equipment Currently Used at Home none  -CS     Existing Precautions/Restrictions no known precautions/restrictions  -CS     Barriers to Rehab none identified  -CS     Row Name 22 1300          Living Environment    Current Living Arrangements home  -CS     Home Accessibility stairs to enter home;stairs within home  -CS     People in Home parent(s)  -CS     Primary Care Provided by self  -CS     Row Name 22 1300          Home Main Entrance    Number of Stairs, Main Entrance none  -CS     Row Name 22 1300          Stairs Within Home, Primary    Number of Stairs, Within Home, Primary none  -CS     Row Name 22 1300          Pain    Pretreatment Pain Rating 0/10 -  no pain  -CS     Posttreatment Pain Rating 0/10 - no pain  -     Row Name 08/04/22 1300          Cognition    Orientation Status (Cognition) oriented x 4  -     Row Name 08/04/22 1300          Range of Motion Comprehensive    General Range of Motion no range of motion deficits identified  except for left lower extremity ankle due to ace wraps for cellulitis  -     Row Name 08/04/22 1300          Strength Comprehensive (MMT)    General Manual Muscle Testing (MMT) Assessment no strength deficits identified  -     Row Name 08/04/22 1300          Bed Mobility    Bed Mobility bed mobility (all) activities  -     All Activities, [place] (Bed Mobility) modified independence  -     Row Name 08/04/22 1300          Transfers    Transfers sit-stand transfer  -     Sit-Stand [place] (Transfers) modified independence  -     Row Name 08/04/22 1300          Sit-Stand Transfer    Assistive Device (Sit-Stand Transfers) other (see comments)  arm rests of recliner  -     Row Name 08/04/22 1300          Gait/Stairs (Locomotion)    Gait/Stairs Locomotion gait/ambulation assistive device  -     [place] Level (Gait) supervision  -     Assistive Device (Gait) walker, standard  -     Distance in Feet (Gait) 45  -CS     Left Sided Gait Deviations weight shift ability decreased  -     Row Name 08/04/22 1300          Safety Issues, Functional Mobility    Impairments Affecting Function (Mobility) --  n/a  -     Row Name 08/04/22 1300          Balance    Balance Assessment standing dynamic balance  -     Dynamic Standing Balance modified independence  -     Position/Device Used, Standing Balance supported;walker, standard  -     Row Name             Wound 07/30/22 1240 Left anterior foot Laceration    Wound - Properties Group Placement Date: 07/30/22  -VS Placement Time: 1240  -VS Present on Hospital Admission: Y  -VS Side: Left  -VS Orientation: anterior  -VS Location: foot  -VS Primary Wound  Type: Laceration  -VS     Retired Wound - Properties Group Placement Date: 07/30/22  -VS Placement Time: 1240  -VS Present on Hospital Admission: Y  -VS Side: Left  -VS Orientation: anterior  -VS Location: foot  -VS Primary Wound Type: Laceration  -VS     Retired Wound - Properties Group Date first assessed: 07/30/22  -VS Time first assessed: 1240  -VS Present on Hospital Admission: Y  -VS Side: Left  -VS Location: foot  -VS Primary Wound Type: Laceration  -VS     Row Name             Wound 08/01/22 1057 Left foot    Wound - Properties Group Placement Date: 08/01/22  -BM Placement Time: 1057  -BM Side: Left  -BM Location: foot  -BM     Retired Wound - Properties Group Placement Date: 08/01/22  -BM Placement Time: 1057  -BM Side: Left  -BM Location: foot  -BM     Retired Wound - Properties Group Date first assessed: 08/01/22  -BM Time first assessed: 1057  -BM Side: Left  -BM Location: foot  -BM     Row Name 08/04/22 1300          Plan of Care Review    Plan of Care Reviewed With patient  -CS     Progress improving  -CS     Outcome Evaluation Patient presents with no physical limitations that impede his ability to return home independently or with assist from family as needed.  Patient is safe to ambulate with standard or rolling walker in the room or to and from the bathroom.  Patient will be discharged from physical therapy caseload.  -CS     Row Name 08/04/22 1300          Therapy Assessment/Plan (PT)    Criteria for Skilled Interventions Met (PT) no problems identified which require skilled intervention  -CS     Therapy Frequency (PT) evaluation only  -CS     Row Name 08/04/22 1300          PT Evaluation Complexity    History, PT Evaluation Complexity no personal factors and/or comorbidities  -CS     Examination of Body Systems (PT Eval Complexity) total of 4 or more elements  -CS     Clinical Presentation (PT Evaluation Complexity) stable  -CS     Clinical Decision Making (PT Evaluation Complexity) low  complexity  -CS     Overall Complexity (PT Evaluation Complexity) low complexity  -CS     Row Name 08/04/22 1300          Therapy Plan Review/Discharge Plan (PT)    Therapy Plan Review (PT) evaluation/treatment results reviewed;patient  -CS           User Key  (r) = Recorded By, (t) = Taken By, (c) = Cosigned By    Initials Name Provider Type    BM Osmin Mcwilliams, RN Registered Nurse    Lou Barreto, FREDY Physical Therapist    VS Kellie Schuster RN Registered Nurse                Physical Therapy Education                 Title: PT OT SLP Therapies (In Progress)     Topic: Physical Therapy (In Progress)     Point: Mobility training (Done)     Learning Progress Summary           Patient Acceptance, E,TB, VU by  at 8/4/2022 1313                   Point: Home exercise program (Not Started)     Learner Progress:  Not documented in this visit.          Point: Body mechanics (Not Started)     Learner Progress:  Not documented in this visit.          Point: Precautions (Done)     Learning Progress Summary           Patient Acceptance, E,TB, VU by  at 8/4/2022 1313                               User Key     Initials Effective Dates Name Provider Type Discipline     04/25/21 -  Lou Coreas, PT Physical Therapist PT              PT Recommendation and Plan  Anticipated Discharge Disposition (PT): home, home with assist, home with outpatient therapy services, other (see comments) (outpatient wound care; pt has family or friends that will transport to [facility])  Therapy Frequency (PT): evaluation only  Plan of Care Reviewed With: patient  Progress: improving  Outcome Evaluation: Patient presents with no physical limitations that impede his ability to return home independently or with assist from family as needed.  Patient is safe to ambulate with standard or rolling walker in the room or to and from the bathroom.  Patient will be discharged from physical therapy caseload.   Outcome Measures     Row Name 08/04/22  1300             How much help from another person do you currently need...    Turning from your back to your side while in flat bed without using bedrails? 4  -CS      Moving from lying on back to sitting on the side of a flat bed without bedrails? 4  -CS      Moving to and from a bed to a chair (including a wheelchair)? 4  -CS      Standing up from a chair using your arms (e.g., wheelchair, bedside chair)? 4  -CS      Climbing 3-5 steps with a railing? 3  -CS      To walk in hospital room? 4  -CS      AM-PAC 6 Clicks Score (PT) 23  -CS              Functional Assessment    Outcome Measure Options AM-PAC 6 Clicks Basic Mobility (PT)  -CS            User Key  (r) = Recorded By, (t) = Taken By, (c) = Cosigned By    Initials Name Provider Type    Lou Barreto PT Physical Therapist                 Time Calculation:    PT Charges     Row Name 08/04/22 1302             Time Calculation    PT Received On 08/04/22  -CS              Untimed Charges    PT Eval/Re-eval Minutes 46  -CS              Total Minutes    Untimed Charges Total Minutes 46  -CS       Total Minutes 46  -CS            User Key  (r) = Recorded By, (t) = Taken By, (c) = Cosigned By    Initials Name Provider Type    Lou Barreto PT Physical Therapist              Therapy Charges for Today     Code Description Service Date Service Provider Modifiers Qty    29930587052 HC PT EVAL LOW COMPLEXITY 4 8/4/2022 Lou Coreas, PT GP 1          PT G-Codes  Outcome Measure Options: AM-PAC 6 Clicks Daily Activity (OT), Optimal Instrument  AM-PAC 6 Clicks Score (PT): 23  AM-PAC 6 Clicks Score (OT): 21    Lou Coreas PT  8/4/2022

## 2022-08-04 NOTE — DISCHARGE SUMMARY
[facility]         DISCHARGE SUMMARY    Patient Name: Dank Johnson  : 1960  MRN: [id number]    Date of Admission: 2022  Date of Discharge: 2022  Primary Care Physician: Pardeep Le MD    Consults     Date and Time Order Name Status Description    2022  9:43 AM Inpatient Rheumatology Consult      2022 11:24 AM Inpatient Podiatry Consult Completed           Presenting Problem:   Cellulitis and abscess of foot [L03.119, L02.619]  Cellulitis and abscess of toe of left foot [L03.032, L02.612]    Active and Resolved Hospital Problems:  Active Hospital Problems    Diagnosis POA   • **Cellulitis and abscess of toe of left foot [L03.032, L02.612] Yes     Priority: High   • Rheumatoid arthritis involving multiple sites (HCC) [M06.9] Yes     Priority: Medium      Resolved Hospital Problems   No resolved problems to display.         Hospital Course     Hospital Course:  Dank Johnson is a 61 y.o. male was admitted with left foot pain.  Have drainage and ulcer and a hole on the top of his left foot, and cellulitis.  He has known rheumatoid arthritis with extensive degenerative joint changes.  Work-up done to rule out osteomyelitis with MRI, was negative.  Did on IV antibiotics.  He was seen by podiatry Dr. Abel German.  Wound cultures were done.  He was seen by rheumatology Dr. Neri.  He was on IV vancomycin.  He will be discharged home after getting the first dose of daptomycin here, continue for 14 days.  Discussed with outpatient pharmacy [facility].  Discharged home on 2022 after PICC line placed.      DISCHARGE Follow Up Recommendations for labs and diagnostics: PMDs office      Day of Discharge     Vital Signs:  Temp:  [97.2 °F (36.2 °C)-98.4 °F (36.9 °C)] 98.4 °F (36.9 °C)  Heart Rate:  [45-77] 69  Resp:  [18-20] 20  BP: (141-157)/(85-99) 154/99  Physical Exam:  Constitutional: Awake, alert   Eyes: PERRLA, sclerae anicteric, no conjunctival injection   HENT: NCAT,  mucous membranes moist   Neck: Supple, no thyromegaly, no lymphadenopathy, trachea midline   Respiratory: Clear to auscultation bilaterally, nonlabored respirations    Cardiovascular: RRR, no murmurs, rubs, or gallops, palpable pedal pulses bilaterally   Gastrointestinal: Positive bowel sounds, soft, nontender, nondistended   Musculoskeletal: No bilateral ankle edema, no clubbing or cyanosis to extremities   Psychiatric: Appropriate affect, cooperative   Neurologic: Oriented x 3, strength symmetric in all extremities, Cranial Nerves grossly intact to confrontation, speech clear   Skin: No rashes       Pertinent  and/or Most Recent Results     LAB RESULTS:      Lab 08/01/22  1108 07/31/22  0624 07/30/22  1647 07/30/22  1545 07/30/22  1439 07/30/22  1129   WBC 8.01 10.90* 12.50*  --   --  12.84*   HEMOGLOBIN 14.4 15.0 14.9  --   --  15.6   HEMATOCRIT 43.9 46.2 45.7  --   --  48.8   PLATELETS 270 273 237  --   --  229   NEUTROS ABS 5.63 8.68* 10.10*  --   --  11.52*   IMMATURE GRANS (ABS) 0.03 0.04 0.04  --   --  0.05   LYMPHS ABS 0.97 0.87 1.05  --   --  0.61*   MONOS ABS 0.88 0.94* 1.08*  --   --  0.53   EOS ABS 0.44* 0.32 0.19  --   --  0.09   MCV 88.7 88.8 89.8  --   --  90.5   SED RATE  --   --   --  55*  --   --    CRP 12.13*  --   --   --   --  21.14*   LACTATE  --   --   --   --  2.0  --          Lab 08/03/22  0553 08/01/22  1108 07/31/22  0624 07/30/22  1647 07/30/22  1129   SODIUM 136 136 134* 136 131*   POTASSIUM 4.4 4.2 4.2 4.3 4.0   CHLORIDE 102 102 100 100 97*   CO2 26.0 27.3 22.8 27.9 24.3   ANION GAP 8.0 6.7 11.2 8.1 9.7   BUN 13 14 13 10 11   CREATININE 0.88 0.82 0.89 0.77 0.73*   EGFR 99.1 101.2 98.7 103.1 104.8   GLUCOSE 102* 130* 115* 103* 176*   CALCIUM 8.8 8.8 8.9 8.8 8.8         Lab 08/01/22  1108 07/30/22  1129   TOTAL PROTEIN 7.0 7.6   ALBUMIN 2.80* 2.90*   GLOBULIN 4.2 4.7   ALT (SGPT) 16 18   AST (SGOT) 15 22   BILIRUBIN 0.9 0.9   ALK PHOS 175* 175*                     Brief Urine Lab Results      None        Microbiology Results (last 10 days)     Procedure Component Value - Date/Time    Wound Culture - Wound, Foot, Left [[id number]]  (Abnormal)  (Susceptibility) Collected: 08/01/22 0710    Lab Status: Final result Specimen: Wound from Foot, Left Updated: 08/03/22 0703     Wound Culture Moderate growth (3+) Staphylococcus aureus, MRSA     Comment: Methicillin resistant Staphylococcus aureus, Patient may be an isolation risk.         Moderate growth (3+) Normal Skin Kaycee     Gram Stain Many (4+) Gram negative bacilli      Moderate (3+) Gram positive cocci in pairs and chains    Susceptibility      Staphylococcus aureus, MRSA      GAURAV      Clindamycin Susceptible      Erythromycin Resistant     Inducible Clindamycin Resistance Negative      Oxacillin Resistant     Rifampin Susceptible      Tetracycline Susceptible      Trimethoprim + Sulfamethoxazole Susceptible      Vancomycin Susceptible                       Susceptibility Comments     Staphylococcus aureus, MRSA    This isolate does not demonstrate inducible clindamycin resistance in vitro.               Blood Culture - Blood, Arm, Left [[id number]]  (Normal) Collected: 07/30/22 1454    Lab Status: Preliminary result Specimen: Blood from Arm, Left Updated: 08/03/22 1533     Blood Culture No growth at 4 days    Blood Culture - Blood, Arm, Left [[id number]]  (Normal) Collected: 07/30/22 1439    Lab Status: Preliminary result Specimen: Blood from Arm, Left Updated: 08/03/22 1502     Blood Culture No growth at 4 days          XR Foot 3+ View Left    Result Date: 7/30/2022  Impression:  Severe degenerative changes along metatarsophalangeal joints with associated subluxation and/or dislocation.  The chronicity of this finding is unclear.  No prior imaging is available for comparison.  Given history, a superimposed infection cannot be excluded.      CRISTINA WHITE MD       Electronically Signed and Approved By: CRISTINA WHITE MD on 7/30/2022 at 15:15                Results for orders placed during the hospital encounter of 07/30/22    Doppler Arterial Multi Level Lower Extremity - Bilateral CAR    Interpretation Summary  · Right Conclusion: The right MARY is normal. Normal digital pressures.  · Left Conclusion: The left MARY is normal. Left digit pressure declined by patient.  · Normal arterial evaluation of both lower extremities.      Results for orders placed during the hospital encounter of 07/30/22    Doppler Arterial Multi Level Lower Extremity - Bilateral CAR    Interpretation Summary  · Right Conclusion: The right MARY is normal. Normal digital pressures.  · Left Conclusion: The left MARY is normal. Left digit pressure declined by patient.  · Normal arterial evaluation of both lower extremities.          Labs Pending at Discharge:  Pending Labs     Order Current Status    Blood Culture - Blood, Arm, Left Preliminary result    Blood Culture - Blood, Arm, Left Preliminary result            Discharge Details        Discharge Medications      New Medications      Instructions Start Date   bisacodyl 5 MG EC tablet  Commonly known as: DULCOLAX   5 mg, Oral, Daily PRN      bisacodyl 10 MG suppository  Commonly known as: DULCOLAX   10 mg, Rectal, Daily PRN      famotidine 20 MG tablet  Commonly known as: PEPCID   20 mg, Oral, Daily   Start Date: August 5, 2022     HYDROcodone-acetaminophen 5-325 MG per tablet  Commonly known as: NORCO   1 tablet, Oral, Every 6 Hours PRN      polyethylene glycol 17 g packet  Commonly known as: MIRALAX   17 g, Oral, Daily PRN      prednisoLONE acetate 1 % ophthalmic suspension  Commonly known as: PRED FORTE   2 drops, Right Eye, Every 8 Hours Scheduled      sennosides-docusate 8.6-50 MG per tablet  Commonly known as: PERICOLACE   2 tablets, Oral, Nightly      Sodium Hypochlorite 0.062 % solution   473 mL, Apply externally, 2 Times Daily         He was prescribed hydrocodone 10/325 every 6 hours as needed    No Known Allergies      Discharge  Disposition:  Home or Self Care    Patient Condition on discharge: Stable    Diet:  Hospital:  Diet Order   Procedures   • Diet Regular; Cardiac         Discharge Activity: As tolerated        CODE STATUS:  Code Status and Medical Interventions:   Ordered at: 07/30/22 1518     Code Status (Patient has no pulse and is not breathing):    CPR (Attempt to Resuscitate)     Medical Interventions (Patient has pulse or is breathing):    Full Support         No future appointments.    Additional Instructions for the Follow-ups that You Need to Schedule     Discharge Follow-up with Specialty: Rheumatology; 6 Weeks   As directed      Specialty: Rheumatology    Follow Up: 6 Weeks    Follow Up Details: Dr. Saman Neri rheumatologist                 Electronically signed by Kaila Hernandez MD, 08/04/22, 1:06 PM EDT.

## 2022-08-04 NOTE — PLAN OF CARE
Goal Outcome Evaluation:  Plan of Care Reviewed With: patient        Progress: improving  Outcome Evaluation: Patient presents with no physical limitations that impede his ability to return home independently or with assist from family as needed.  Patient is safe to ambulate with standard or rolling walker in the room or to and from the bathroom.  Patient will be discharged from physical therapy caseload.

## 2022-08-04 NOTE — PLAN OF CARE
Goal Outcome Evaluation:           Progress: improving  Outcome Evaluation: patient discharging home today with IV antibiotics. Patient to have home health with [facility]. Patients picc line placed and education given. MD notified of increased blood pressure prior to discharge. Patient given oral hydralazine, MD miller states for patient to follow up in Dr stiles office if blood pressures continue to be high and home health will also monitor. Wound pics taken and wound care preformed as ordered

## 2022-08-04 NOTE — PLAN OF CARE
Goal Outcome Evaluation:  Plan of Care Reviewed With: patient        Progress: no change (first session for eval)  Outcome Evaluation: Patient presents with limitations of balance and endurance/ activity tolerance impacting ADL/transfer (I). OT is needed to remediate/compensate for deficits to maximize (I).

## 2022-08-05 NOTE — OUTREACH NOTE
Prep Survey    Flowsheet Row Responses   [facility] facility patient discharged from? Up   Is LACE score < 7 ? No   Emergency Room discharge w/ pulse ox? No   Eligibility Readm Mgmt   Discharge diagnosis cellulitis and abscess of left foot and toe   Does the patient have one of the following disease processes/diagnoses(primary or secondary)? Other   Does the patient have Home health ordered? Yes   What is the Home health agency?  [facility]   Is there a DME ordered? No   Medication alerts for this patient 14 days of IV daptomycin at home   Prep survey completed? Yes          ADRI ORTEZ - Registered Nurse

## 2022-08-09 ENCOUNTER — READMISSION MANAGEMENT (OUTPATIENT)
Dept: CALL CENTER | Facility: HOSPITAL | Age: 62
End: 2022-08-09

## 2022-08-09 NOTE — OUTREACH NOTE
Medical Week 1 Survey    Flowsheet Row Responses   [facility] [place] facility patient discharged from? Up   Does the patient have one of the following disease processes/diagnoses(primary or secondary)? Other   Week 1 attempt successful? No   Unsuccessful attempts Attempt 1          LAVERNE NICHOLS - Registered Nurse

## 2022-08-16 ENCOUNTER — READMISSION MANAGEMENT (OUTPATIENT)
Dept: CALL CENTER | Facility: HOSPITAL | Age: 62
End: 2022-08-16

## 2022-08-16 NOTE — OUTREACH NOTE
Medical Week 2 Survey    Flowsheet Row Responses   [facility] patient discharged from? Up   Does the patient have one of the following disease processes/diagnoses(primary or secondary)? Other   Week 2 attempt successful? No   Unsuccessful attempts Attempt 1          SUKI NICHOLS - Licensed Nurse

## 2022-08-22 ENCOUNTER — READMISSION MANAGEMENT (OUTPATIENT)
Dept: CALL CENTER | Facility: HOSPITAL | Age: 62
End: 2022-08-22

## 2022-08-22 NOTE — OUTREACH NOTE
Medical Week 3 Survey    Flowsheet Row Responses   [facility] patient discharged from? Up   Does the patient have one of the following disease processes/diagnoses(primary or secondary)? Other   Week 3 attempt successful? Yes   Call start time 1444   Call end time 1445   Is patient permission given to speak with other caregiver? Yes   List who call center can speak with Salud mother    Person spoke with today (if not patient) and relationship Marika mother    Meds reviewed with patient/caregiver? Yes   Is the patient having any side effects they believe may be caused by any medication additions or changes? No   Does the patient have all medications ordered at discharge? Yes   Is the patient taking all medications as directed (includes completed medication regime)? Yes   Comments regarding appointments Pt has an opthalmology apt on 8/31/22 per [facility]    Does the patient have a primary care provider?  Yes   Has the patient kept scheduled appointments due by today? Yes  [Mother states pt had an apt with his PCP last week]   What is the Home health agency?  [facility] comments Patient's mother states HH is coming and dressing wound twice a week at least    Psychosocial issues? No   What is the patient's perception of their health status since discharge? Improving   Is the patient/caregiver able to teach back signs and symptoms related to disease process for when to call PCP? Yes   Is the patient/caregiver able to teach back signs and symptoms related to disease process for when to call 911? Yes   Is the patient/caregiver able to teach back the hierarchy of who to call/visit for symptoms/problems? PCP, Specialist, Home health nurse, Urgent Care, ED, 911 Yes   Week 3 Call Completed? Yes          DIANE  - Registered Nurse

## 2023-01-20 ENCOUNTER — CLINICAL SUPPORT (OUTPATIENT)
Dept: GASTROENTEROLOGY | Facility: CLINIC | Age: 63
End: 2023-01-20

## 2023-01-20 ENCOUNTER — PREP FOR SURGERY (OUTPATIENT)
Dept: OTHER | Facility: HOSPITAL | Age: 63
End: 2023-01-20
Payer: MEDICARE

## 2023-01-20 ENCOUNTER — TELEPHONE (OUTPATIENT)
Dept: GASTROENTEROLOGY | Facility: CLINIC | Age: 63
End: 2023-01-20
Payer: MEDICARE

## 2023-01-20 DIAGNOSIS — R19.5 POSITIVE COLORECTAL CANCER SCREENING USING COLOGUARD TEST: Primary | ICD-10-CM

## 2023-01-20 RX ORDER — POTASSIUM CHLORIDE 1500 MG/1
TABLET, EXTENDED RELEASE ORAL
COMMUNITY
Start: 2022-11-16

## 2023-01-20 RX ORDER — COLLAGENASE SANTYL 250 [ARB'U]/G
OINTMENT TOPICAL
COMMUNITY
Start: 2022-12-19

## 2023-01-20 RX ORDER — ZINC SULFATE 50(220)MG
CAPSULE ORAL
COMMUNITY
Start: 2022-12-20

## 2023-01-20 NOTE — TELEPHONE ENCOUNTER
Dank Johnson  REASON FOR CALL COLON SCREENING - POSITIVE COLOGUARD    SENT IN PREP 4L    PROCEDURE DATE: 4/17/2023 (PT REQUEST)    NO PULMONOLOGIST / NO CARDIOLOGIST / NO BLOOD THINNER PER PT  Past Medical History:   Diagnosis Date   • Arthritis    • Foot infection      No Known Allergies  Past Surgical History:   Procedure Laterality Date   • EYE SURGERY Right     cornea transplant     Social History     Socioeconomic History   • Marital status:    Tobacco Use   • Smoking status: Every Day     Packs/day: 1.00     Types: Cigarettes     Start date: 1/1/1977   • Smokeless tobacco: Never   Vaping Use   • Vaping Use: Never used   Substance and Sexual Activity   • Alcohol use: Yes     Alcohol/week: 1.0 standard drink     Types: 1 Glasses of wine per week     Comment: mayabe every 3 months   • Drug use: Not Currently     Comment: when he was younger   • Sexual activity: Yes     Partners: Female     Birth control/protection: Condom     Family History   Problem Relation Age of Onset   • Colon cancer Neg Hx        Current Outpatient Medications:   •  KLOR-CON 20 MEQ CR tablet, 1 TAB(S) ORALLY ONCE DAILY 7 DAYS, Disp: , Rfl:   •  Santyl 250 UNIT/GM ointment, APPLY TO AFFECTED AREA TOPICALLY ONCE DAILY, Disp: , Rfl:   •  zinc sulfate (ZINCATE) 220 (50 Zn) MG capsule, TAKE 1 CAPSULE BY MOUTH THREE TIMES A DAY FOR 30 DAYS, Disp: , Rfl:

## 2023-04-13 NOTE — PRE-PROCEDURE INSTRUCTIONS
Arrival-time of 0600.    Must have a  for transportation home post-procedure.    Education provided on laxative administration; bowel prep to be taken in two doses.  Reviewed diet instructions for day prior to procedure.  Only plain, unflavored water after midnight until two hours prior to arrival-time.    Patient has not picked up bowel prep yet, confirmed pharmacy on file is correct.  Advised to  the prep as soon as possible and call back with any questions.    Do not take any morning medications on the day of the procedure.  Instead bring all prescribed medications to the hospital on the morning of the procedure.     Patient verbalized understanding of instructions.    Audrey Jason RN

## 2023-04-14 ENCOUNTER — PREP FOR SURGERY (OUTPATIENT)
Dept: OTHER | Facility: HOSPITAL | Age: 63
End: 2023-04-14
Payer: MEDICARE

## 2023-04-17 ENCOUNTER — TELEPHONE (OUTPATIENT)
Dept: GASTROENTEROLOGY | Facility: CLINIC | Age: 63
End: 2023-04-17
Payer: MEDICARE

## 2023-04-17 NOTE — TELEPHONE ENCOUNTER
Dank Johnson  1960    Patient requested to Reschedule their Colonoscopy. I have offered to reschedule this patient and patient has agreed. Patient has been rescheduled to 6/2/23    Reason for cancelling/rescheduling: Patient's ride did not show up     This procedure was ordered by Delfin Ramirez MD for an important reason. We want to inform you that there are risks associated with not proceeding with the procedure at this time such as a delay in diagnosis, risk of incurable disease, or cancer.      Updated clearance needed?: No      Patient verbalized understanding for all of the above information.    Patient drank prep, will need new prep sent in

## 2023-05-30 NOTE — PRE-PROCEDURE INSTRUCTIONS
Mailbox full on home number.  Unable to leave message.  Called cell number and left message to return call.

## 2023-05-31 NOTE — PAT
Spoke with patient via telephone.  Arrival time listed as 1000 am  Discussed location at hospital, come to [facility].  Please bring picture id, insurance cards,  over 19 y/o, and bring a current med list or bottles. Do not take any meds the am of procedure,  Discussed clear liquid diet all day Thurs, start laxative prep at 6 pm Thursday, drink 2/3rds dose, Clear liquids until midnight.then may have water until 0800. Start 2nd part of prep at 0600 day of test. Finish drinking water by 0800 am.

## 2023-06-01 NOTE — PRE-PROCEDURE INSTRUCTIONS
Instructed on arrival time.  Will need  over the age of 18 to drive them home.  Do not take any medications the day of the procedure, but bring them with you.  Discussed diet and bowel prep.  Come to entrance C.  Verbalized understanding of instructions given. Instructed to call if any questions or concerns.

## 2023-06-02 ENCOUNTER — ANESTHESIA (OUTPATIENT)
Dept: GASTROENTEROLOGY | Facility: HOSPITAL | Age: 63
End: 2023-06-02
Payer: MEDICARE

## 2023-06-02 ENCOUNTER — HOSPITAL ENCOUNTER (OUTPATIENT)
Facility: HOSPITAL | Age: 63
Setting detail: HOSPITAL OUTPATIENT SURGERY
Discharge: HOME | End: 2023-06-02
Attending: INTERNAL MEDICINE | Admitting: INTERNAL MEDICINE
Payer: MEDICARE

## 2023-06-02 ENCOUNTER — ANESTHESIA EVENT (OUTPATIENT)
Dept: GASTROENTEROLOGY | Facility: HOSPITAL | Age: 63
End: 2023-06-02
Payer: MEDICARE

## 2023-06-02 VITALS
OXYGEN SATURATION: 90 % | RESPIRATION RATE: 18 BRPM | DIASTOLIC BLOOD PRESSURE: 101 MMHG | BODY MASS INDEX: 34.47 KG/M2 | SYSTOLIC BLOOD PRESSURE: 155 MMHG | HEIGHT: 70 IN | HEART RATE: 67 BPM | TEMPERATURE: 96.5 F | WEIGHT: 240.74 LBS

## 2023-06-02 DIAGNOSIS — R19.5 POSITIVE COLORECTAL CANCER SCREENING USING COLOGUARD TEST: ICD-10-CM

## 2023-06-02 PROCEDURE — 88305 TISSUE EXAM BY PATHOLOGIST: CPT | Performed by: INTERNAL MEDICINE

## 2023-06-02 PROCEDURE — 45385 COLONOSCOPY W/LESION REMOVAL: CPT | Performed by: INTERNAL MEDICINE

## 2023-06-02 PROCEDURE — 25010000002 PROPOFOL 10 MG/ML EMULSION: Performed by: NURSE ANESTHETIST, CERTIFIED REGISTERED

## 2023-06-02 RX ORDER — SODIUM CHLORIDE, SODIUM LACTATE, POTASSIUM CHLORIDE, CALCIUM CHLORIDE 600; 310; 30; 20 MG/100ML; MG/100ML; MG/100ML; MG/100ML
30 INJECTION, SOLUTION INTRAVENOUS CONTINUOUS
Status: DISCONTINUED | OUTPATIENT
Start: 2023-06-02 | End: 2023-06-02 | Stop reason: HOSPADM

## 2023-06-02 RX ORDER — PROPOFOL 10 MG/ML
VIAL (ML) INTRAVENOUS AS NEEDED
Status: DISCONTINUED | OUTPATIENT
Start: 2023-06-02 | End: 2023-06-02 | Stop reason: SURG

## 2023-06-02 RX ORDER — LIDOCAINE HYDROCHLORIDE 20 MG/ML
INJECTION, SOLUTION EPIDURAL; INFILTRATION; INTRACAUDAL; PERINEURAL AS NEEDED
Status: DISCONTINUED | OUTPATIENT
Start: 2023-06-02 | End: 2023-06-02 | Stop reason: SURG

## 2023-06-02 RX ADMIN — PROPOFOL 250 MCG/KG/MIN: 10 INJECTION, EMULSION INTRAVENOUS at 11:41

## 2023-06-02 RX ADMIN — SODIUM CHLORIDE, POTASSIUM CHLORIDE, SODIUM LACTATE AND CALCIUM CHLORIDE: 600; 310; 30; 20 INJECTION, SOLUTION INTRAVENOUS at 11:38

## 2023-06-02 RX ADMIN — PROPOFOL 80 MG: 10 INJECTION, EMULSION INTRAVENOUS at 11:41

## 2023-06-02 RX ADMIN — LIDOCAINE HYDROCHLORIDE 100 MG: 20 INJECTION, SOLUTION EPIDURAL; INFILTRATION; INTRACAUDAL; PERINEURAL at 11:41

## 2023-06-02 NOTE — H&P
Pre Procedure History & Physical    Chief Complaint:   Positive Cologuard    Subjective     HPI:   Positive Cologuard    Past Medical History:   Past Medical History:   Diagnosis Date   • Arthritis    • Foot infection        Past Surgical History:  Past Surgical History:   Procedure Laterality Date   • EYE SURGERY Right     cornea transplant       Family History:  Family History   Problem Relation Age of Onset   • Colon cancer Neg Hx        Social History:   reports that he has been smoking cigarettes. He started smoking about 46 years ago. He has been smoking an average of 1 pack per day. He has never used smokeless tobacco. He reports current alcohol use of about 1.0 standard drink per week. He reports that he does not currently use drugs.    Medications:   Medications Prior to Admission   Medication Sig Dispense Refill Last Dose   • KLOR-CON 20 MEQ CR tablet 1 TAB(S) ORALLY ONCE DAILY 7 DAYS      • polyethylene glycol (GoLYTELY) 236 g solution TAKE AS DIRECTED BY YOUR PROVIDER 4000 mL 0    • Santyl 250 UNIT/GM ointment APPLY TO AFFECTED AREA TOPICALLY ONCE DAILY      • zinc sulfate (ZINCATE) 220 (50 Zn) MG capsule TAKE 1 CAPSULE BY MOUTH THREE TIMES A DAY FOR 30 DAYS          Allergies:  Patient has no known allergies.        Objective     Weight 109 kg (240 lb 11.9 oz).    Physical Exam   Constitutional: Pt is oriented to person, place, and time and well-developed, well-nourished, and in no distress.   Mouth/Throat: Oropharynx is clear and moist.   Neck: Normal range of motion.   Cardiovascular: Normal rate, regular rhythm and normal heart sounds.    Pulmonary/Chest: Effort normal and breath sounds normal.   Abdominal: Soft. Nontender  Skin: Skin is warm and dry.   Psychiatric: Mood, memory, affect and judgment normal.     Assessment & Plan     Diagnosis:  Positive Cologuard    Anticipated Surgical Procedure:  Colonoscopy    The risks, benefits, and alternatives of this procedure have been discussed with the  patient or the responsible party- the patient understands and agrees to proceed.

## 2023-06-02 NOTE — ANESTHESIA POSTPROCEDURE EVALUATION
Patient: Dank Johnson    Procedure Summary     Date: 06/02/23 Room / Location: [facility] ENDOSCOPY 4 / [facility] ENDOSCOPY    Anesthesia Start: 1138 Anesthesia Stop: 1212    Procedure: COLONOSCOPY WITH POLYPECTOMIES Diagnosis:       Positive colorectal cancer screening using Cologuard test      (Positive colorectal cancer screening using Cologuard test [R19.5])    Surgeons: Pardeep Ramirez MD Provider: Dylon Escamilla CRNA    Anesthesia Type: general ASA Status: 2          Anesthesia Type: general    Vitals  Vitals Value Taken Time   /101 06/02/23 1235   Temp 35.8 °C (96.5 °F) 06/02/23 1235   Pulse 75 06/02/23 1236   Resp 18 06/02/23 1235   SpO2 89 % 06/02/23 1236   Vitals shown include unvalidated device data.        Post Anesthesia Care and Evaluation    Patient location during evaluation: bedside  Patient participation: complete - patient participated  Level of consciousness: awake  Pain management: adequate    Airway patency: patent  PONV Status: none  Cardiovascular status: acceptable and stable  Respiratory status: acceptable  Hydration status: acceptable    Comments: An Anesthesiologist personally participated in the most demanding procedures (including induction and emergence if applicable) in the anesthesia plan, monitored the course of anesthesia administration at frequent intervals and remained physically present and available for immediate diagnosis and treatment of emergencies.

## 2023-06-02 NOTE — ANESTHESIA PREPROCEDURE EVALUATION
Anesthesia Evaluation     Patient summary reviewed and Nursing notes reviewed   no history of anesthetic complications:  NPO Solid Status: > 8 hours  NPO Liquid Status: > 2 hours           Airway   Mallampati: II  TM distance: >3 FB  Neck ROM: full  No difficulty expected  Dental    (+) edentulous    Pulmonary - normal exam    breath sounds clear to auscultation  (+) a smoker Current,   Cardiovascular - normal exam  Exercise tolerance: good (4-7 METS)    Rhythm: regular  Rate: normal    (+) hypertension (no meds),       Neuro/Psych- negative ROS  GI/Hepatic/Renal/Endo    (+) obesity,       Musculoskeletal     Abdominal    Substance History      OB/GYN          Other        ROS/Med Hx Other: PAT Nursing Notes unavailable.                    Anesthesia Plan    ASA 2     general   total IV anesthesia    Anesthetic plan, risks, benefits, and alternatives have been provided, discussed and informed consent has been obtained with: patient.        CODE STATUS:

## 2023-06-06 LAB
CYTO UR: NORMAL
LAB AP CASE REPORT: NORMAL
LAB AP CLINICAL INFORMATION: NORMAL
PATH REPORT.FINAL DX SPEC: NORMAL
PATH REPORT.GROSS SPEC: NORMAL

## 2023-06-09 ENCOUNTER — TELEPHONE (OUTPATIENT)
Dept: GASTROENTEROLOGY | Facility: CLINIC | Age: 63
End: 2023-06-09
Payer: MEDICARE

## 2023-06-09 NOTE — TELEPHONE ENCOUNTER
Patient placed in 5 year colon cancer screening. Letter sent ----- Message from LALA Arias sent at 6/8/2023  5:25 PM EDT -----  Colonoscopy 6/2/23: Good bowel prep, normal colonoscopy, 3 small polyps removed-hyperplastic/benign, internal hemorrhoids  Repeat colonoscopy 5 years  Send letter

## 2024-08-26 ENCOUNTER — HOSPITAL ENCOUNTER (EMERGENCY)
Facility: HOSPITAL | Age: 64
Discharge: HOME | End: 2024-08-26
Attending: EMERGENCY MEDICINE
Payer: MEDICARE

## 2024-08-26 VITALS
HEIGHT: 70 IN | TEMPERATURE: 98.4 F | OXYGEN SATURATION: 90 % | WEIGHT: 249.78 LBS | SYSTOLIC BLOOD PRESSURE: 164 MMHG | HEART RATE: 82 BPM | RESPIRATION RATE: 18 BRPM | BODY MASS INDEX: 35.76 KG/M2 | DIASTOLIC BLOOD PRESSURE: 95 MMHG

## 2024-08-26 DIAGNOSIS — L03.115 CELLULITIS OF RIGHT LEG: Primary | ICD-10-CM

## 2024-08-26 DIAGNOSIS — L08.9 WOUND INFECTION: ICD-10-CM

## 2024-08-26 DIAGNOSIS — T14.8XXA WOUND INFECTION: ICD-10-CM

## 2024-08-26 LAB
ALBUMIN SERPL-MCNC: 3.2 G/DL (ref 3.5–5.2)
ALBUMIN/GLOB SERPL: 0.7 G/DL
ALP SERPL-CCNC: 112 U/L (ref 39–117)
ALT SERPL W P-5'-P-CCNC: 9 U/L (ref 1–41)
ANION GAP SERPL CALCULATED.3IONS-SCNC: 9.9 MMOL/L (ref 5–15)
AST SERPL-CCNC: 13 U/L (ref 1–40)
BASOPHILS # BLD AUTO: 0.07 10*3/MM3 (ref 0–0.2)
BASOPHILS NFR BLD AUTO: 0.8 % (ref 0–1.5)
BILIRUB SERPL-MCNC: 0.4 MG/DL (ref 0–1.2)
BUN SERPL-MCNC: 15 MG/DL (ref 8–23)
BUN/CREAT SERPL: 15.6 (ref 7–25)
CALCIUM SPEC-SCNC: 8.8 MG/DL (ref 8.6–10.5)
CHLORIDE SERPL-SCNC: 100 MMOL/L (ref 98–107)
CO2 SERPL-SCNC: 27.1 MMOL/L (ref 22–29)
CREAT SERPL-MCNC: 0.96 MG/DL (ref 0.76–1.27)
D-LACTATE SERPL-SCNC: 1.2 MMOL/L (ref 0.5–2)
DEPRECATED RDW RBC AUTO: 48.4 FL (ref 37–54)
EGFRCR SERPLBLD CKD-EPI 2021: 89.4 ML/MIN/1.73
EOSINOPHIL # BLD AUTO: 0.45 10*3/MM3 (ref 0–0.4)
EOSINOPHIL NFR BLD AUTO: 5.3 % (ref 0.3–6.2)
ERYTHROCYTE [DISTWIDTH] IN BLOOD BY AUTOMATED COUNT: 15 % (ref 12.3–15.4)
GLOBULIN UR ELPH-MCNC: 4.7 GM/DL
GLUCOSE SERPL-MCNC: 113 MG/DL (ref 65–99)
HCT VFR BLD AUTO: 42.3 % (ref 37.5–51)
HGB BLD-MCNC: 13.8 G/DL (ref 13–17.7)
IMM GRANULOCYTES # BLD AUTO: 0.07 10*3/MM3 (ref 0–0.05)
IMM GRANULOCYTES NFR BLD AUTO: 0.8 % (ref 0–0.5)
LYMPHOCYTES # BLD AUTO: 1.35 10*3/MM3 (ref 0.7–3.1)
LYMPHOCYTES NFR BLD AUTO: 15.8 % (ref 19.6–45.3)
MCH RBC QN AUTO: 28.6 PG (ref 26.6–33)
MCHC RBC AUTO-ENTMCNC: 32.6 G/DL (ref 31.5–35.7)
MCV RBC AUTO: 87.8 FL (ref 79–97)
MONOCYTES # BLD AUTO: 0.79 10*3/MM3 (ref 0.1–0.9)
MONOCYTES NFR BLD AUTO: 9.2 % (ref 5–12)
NEUTROPHILS NFR BLD AUTO: 5.83 10*3/MM3 (ref 1.7–7)
NEUTROPHILS NFR BLD AUTO: 68.1 % (ref 42.7–76)
NRBC BLD AUTO-RTO: 0 /100 WBC (ref 0–0.2)
PLATELET # BLD AUTO: 305 10*3/MM3 (ref 140–450)
PMV BLD AUTO: 10.4 FL (ref 6–12)
POTASSIUM SERPL-SCNC: 3.9 MMOL/L (ref 3.5–5.2)
PROCALCITONIN SERPL-MCNC: 0.06 NG/ML (ref 0–0.25)
PROT SERPL-MCNC: 7.9 G/DL (ref 6–8.5)
RBC # BLD AUTO: 4.82 10*6/MM3 (ref 4.14–5.8)
SODIUM SERPL-SCNC: 137 MMOL/L (ref 136–145)
WBC NRBC COR # BLD AUTO: 8.56 10*3/MM3 (ref 3.4–10.8)

## 2024-08-26 PROCEDURE — 87147 CULTURE TYPE IMMUNOLOGIC: CPT | Performed by: EMERGENCY MEDICINE

## 2024-08-26 PROCEDURE — 83605 ASSAY OF LACTIC ACID: CPT | Performed by: EMERGENCY MEDICINE

## 2024-08-26 PROCEDURE — 84145 PROCALCITONIN (PCT): CPT | Performed by: EMERGENCY MEDICINE

## 2024-08-26 PROCEDURE — 87070 CULTURE OTHR SPECIMN AEROBIC: CPT | Performed by: EMERGENCY MEDICINE

## 2024-08-26 PROCEDURE — 87205 SMEAR GRAM STAIN: CPT | Performed by: EMERGENCY MEDICINE

## 2024-08-26 PROCEDURE — 85025 COMPLETE CBC W/AUTO DIFF WBC: CPT | Performed by: EMERGENCY MEDICINE

## 2024-08-26 PROCEDURE — 99283 EMERGENCY DEPT VISIT LOW MDM: CPT

## 2024-08-26 PROCEDURE — 87186 SC STD MICRODIL/AGAR DIL: CPT | Performed by: EMERGENCY MEDICINE

## 2024-08-26 PROCEDURE — 80053 COMPREHEN METABOLIC PANEL: CPT | Performed by: EMERGENCY MEDICINE

## 2024-08-26 RX ORDER — HYDROCODONE BITARTRATE AND ACETAMINOPHEN 7.5; 325 MG/1; MG/1
1 TABLET ORAL EVERY 6 HOURS PRN
Qty: 12 TABLET | Refills: 0 | Status: SHIPPED | OUTPATIENT
Start: 2024-08-26

## 2024-08-26 RX ORDER — DOXYCYCLINE 100 MG/1
100 CAPSULE ORAL ONCE
Status: COMPLETED | OUTPATIENT
Start: 2024-08-26 | End: 2024-08-26

## 2024-08-26 RX ORDER — SODIUM CHLORIDE 0.9 % (FLUSH) 0.9 %
10 SYRINGE (ML) INJECTION AS NEEDED
Status: DISCONTINUED | OUTPATIENT
Start: 2024-08-26 | End: 2024-08-26 | Stop reason: HOSPADM

## 2024-08-26 RX ORDER — DOXYCYCLINE 100 MG/1
100 CAPSULE ORAL 2 TIMES DAILY
Qty: 14 CAPSULE | Refills: 0 | Status: SHIPPED | OUTPATIENT
Start: 2024-08-26

## 2024-08-26 RX ORDER — CEPHALEXIN 500 MG/1
500 CAPSULE ORAL 3 TIMES DAILY
Qty: 21 CAPSULE | Refills: 0 | Status: SHIPPED | OUTPATIENT
Start: 2024-08-26 | End: 2024-08-29

## 2024-08-26 RX ORDER — HYDROCODONE BITARTRATE AND ACETAMINOPHEN 5; 325 MG/1; MG/1
1 TABLET ORAL ONCE
Status: COMPLETED | OUTPATIENT
Start: 2024-08-26 | End: 2024-08-26

## 2024-08-26 RX ADMIN — DOXYCYCLINE 100 MG: 100 CAPSULE ORAL at 08:29

## 2024-08-26 RX ADMIN — CEPHALEXIN 500 MG: 250 CAPSULE ORAL at 08:29

## 2024-08-26 RX ADMIN — HYDROCODONE BITARTRATE AND ACETAMINOPHEN 1 TABLET: 5; 325 TABLET ORAL at 07:39

## 2024-08-26 NOTE — DISCHARGE INSTRUCTIONS
Your blood work actually looks surprisingly normal, without signs of infection seen.    Make sure you take both of the antibiotics to cover for the usual staph and strep skin infections that can cause this wound to be infected.    Take the pain medicine as needed.    Please call the wound clinic for a follow-up appointment.

## 2024-08-26 NOTE — ED PROVIDER NOTES
Time: 8:10 AM EDT  Date of encounter:  8/26/2024  Independent Historian/Clinical History and Information was obtained by:   Patient and Family    History is limited by: N/A    Chief Complaint: Right leg pain redness and chronic wound      History of Present Illness:  Patient is a 64 y.o. year old male who presents to the emergency department for evaluation of chronic wound to the right leg that looks like it may be getting infected causing worsening right leg pain and redness and some oozing.    He is not actually having fevers or chills.    It looks like he may have previous history of MRSA skin infection.  Not currently on antibiotics.    He has had the wound there for couple of months with skin breakdown around the right lower leg.      Patient Care Team  Primary Care Provider: Pardeep Le MD    Past Medical History:     No Known Allergies  Past Medical History:   Diagnosis Date    Arthritis     Foot infection      Past Surgical History:   Procedure Laterality Date    COLONOSCOPY N/A 6/2/2023    Procedure: COLONOSCOPY WITH POLYPECTOMIES;  Surgeon: Pardeep Ramirez MD;  Location: [facility] ENDOSCOPY;  Service: Gastroenterology;  Laterality: N/A;  COLON POLYPS    EYE SURGERY Right     cornea transplant    HAND SURGERY      ganglion cyst     Family History   Problem Relation Age of Onset    Colon cancer Neg Hx        Home Medications:  Prior to Admission medications    Medication Sig Start Date End Date Taking? Authorizing Provider   cephalexin (KEFLEX) 500 MG capsule Take 1 capsule by mouth 3 (Three) Times a Day for 7 days. 8/26/24 9/2/24  Martin Morse MD   doxycycline (MONODOX) 100 MG capsule Take 1 capsule by mouth 2 (Two) Times a Day. 8/26/24   Martin Morse MD   HYDROcodone-acetaminophen (NORCO) 7.5-325 MG per tablet Take 1 tablet by mouth Every 6 (Six) Hours As Needed for Severe Pain. 8/26/24   Martin Morse MD   KLOR-CON 20 MEQ CR tablet 1 TAB(S) ORALLY ONCE DAILY 7 DAYS 11/16/22   Provider,  "MD Kala   Santyl 250 UNIT/GM ointment APPLY TO AFFECTED AREA TOPICALLY ONCE DAILY 12/19/22   ProviderKala MD   zinc sulfate (ZINCATE) 220 (50 Zn) MG capsule TAKE 1 CAPSULE BY MOUTH THREE TIMES A DAY FOR 30 DAYS 12/20/22   ProviderKala MD        Social History:   Social History     Tobacco Use    Smoking status: Every Day     Current packs/day: 1.00     Average packs/day: 1 pack/day for 47.7 years (47.7 ttl pk-yrs)     Types: Cigarettes     Start date: 1/1/1977    Smokeless tobacco: Never   Vaping Use    Vaping status: Never Used   Substance Use Topics    Alcohol use: Yes     Alcohol/week: 1.0 standard drink of alcohol     Types: 1 Glasses of wine per week     Comment: mayabe every 3 months    Drug use: Not Currently     Comment: when he was younger         Review of Systems:  Review of Systems   I performed a 10 point review of systems which was all negative, except for the positives found in the HPI above.  Physical Exam:  /93   Pulse 72   Temp 98.4 °F (36.9 °C) (Oral)   Resp 18   Ht 177.8 cm (70\")   Wt 113 kg (249 lb 12.5 oz)   SpO2 94%   BMI 35.84 kg/m²     Physical Exam   General: Awake alert and in no obvious distress    HEENT: Head normocephalic atraumatic, eyes PERRLA EOMI, nose normal, oropharynx normal.    Neck: Supple full range of motion, no meningismus, no lymphadenopathy    Heart: Regular rate and rhythm, no murmurs or rubs, 2+ radial pulses bilaterally    Lungs: Clear to auscultation bilaterally without wheezes or crackles, no respiratory distress    Abdomen: Soft, nontender, nondistended, no rebound or guarding    Skin: Warm, dry, no rash    Musculoskeletal: Normal range of motion, mild bilateral lower extremity edema with erythema and tenderness and mild warmth around the anterior right lower leg and some chronic skin breakdown almost circumferentially around the right lower leg but no calf tenderness.    Neurologic: Oriented x3, no motor deficits no sensory " deficits    Psychiatric: Mood appears stable, no psychosis          Procedures:  Procedures      Medical Decision Making:      Comorbidities that affect care:    Hypertension, Smoking    External Notes reviewed:    None      The following orders were placed and all results were independently analyzed by me:  Orders Placed This Encounter   Procedures    Wound Culture - Swab, Leg, Right    Comprehensive Metabolic Panel    Procalcitonin    Lactic Acid, Plasma    CBC Auto Differential    Ambulatory Referral to Wound Clinic    Continuous Pulse Oximetry    Insert Peripheral IV    CBC & Differential       Medications Given in the Emergency Department:  Medications   sodium chloride 0.9 % flush 10 mL (has no administration in time range)   cephalexin (KEFLEX) capsule 500 mg (has no administration in time range)   doxycycline (MONODOX) capsule 100 mg (has no administration in time range)   HYDROcodone-acetaminophen (NORCO) 5-325 MG per tablet 1 tablet (1 tablet Oral Given 8/26/24 0739)        ED Course:         Labs:    Lab Results (last 24 hours)       Procedure Component Value Units Date/Time    Wound Culture - Swab, Leg, Right [[id number]] Collected: 08/26/24 0638    Specimen: Swab from Leg, Right Updated: 08/26/24 0652    CBC & Differential [[id number]]  (Abnormal) Collected: 08/26/24 0644    Specimen: Blood Updated: 08/26/24 0658    Narrative:      The following orders were created for panel order CBC & Differential.  Procedure                               Abnormality         Status                     ---------                               -----------         ------                     CBC Auto Differential[[id number]]        Abnormal            Final result                 Please view results for these tests on the individual orders.    Comprehensive Metabolic Panel [[id number]]  (Abnormal) Collected: 08/26/24 0644    Specimen: Blood Updated: 08/26/24 0715     Glucose 113 mg/dL      BUN 15 mg/dL      Creatinine 0.96  "mg/dL      Sodium 137 mmol/L      Potassium 3.9 mmol/L      Chloride 100 mmol/L      CO2 27.1 mmol/L      Calcium 8.8 mg/dL      Total Protein 7.9 g/dL      Albumin 3.2 g/dL      ALT (SGPT) 9 U/L      AST (SGOT) 13 U/L      Alkaline Phosphatase 112 U/L      Total Bilirubin 0.4 mg/dL      Globulin 4.7 gm/dL      A/G Ratio 0.7 g/dL      BUN/Creatinine Ratio 15.6     Anion Gap 9.9 mmol/L      eGFR 89.4 mL/min/1.73     Narrative:      GFR Normal >60  Chronic Kidney Disease <60  Kidney Failure <15      Procalcitonin [[id number]]  (Normal) Collected: 08/26/24 0644    Specimen: Blood Updated: 08/26/24 0721     Procalcitonin 0.06 ng/mL     Narrative:      As a Marker for Sepsis (Non-Neonates):    1. <0.5 ng/mL represents a low risk of severe sepsis and/or septic shock.  2. >2 ng/mL represents a high risk of severe sepsis and/or septic shock.    As a Marker for Lower Respiratory Tract Infections that require antibiotic therapy:    PCT on Admission    Antibiotic Therapy       6-12 Hrs later    >0.5                Strongly Recommended  >0.25 - <0.5        Recommended  0.1 - 0.25          Discouraged              Remeasure/reassess PCT  <0.1                Strongly Discouraged     Remeasure/reassess PCT    As 28 day mortality risk marker: \"Change in Procalcitonin Result\" (>80% or <=80%) if Day 0 (or Day 1) and Day 4 values are available. Refer to http://www.[employer]-pct-calculator.com    Change in PCT <=80%  A decrease of PCT levels below or equal to 80% defines a positive change in PCT test result representing a higher risk for 28-day all-cause mortality of patients diagnosed with severe sepsis for septic shock.    Change in PCT >80%  A decrease of PCT levels of more than 80% defines a negative change in PCT result representing a lower risk for 28-day all-cause mortality of patients diagnosed with severe sepsis or septic shock.    This test is Prognostic not Diagnostic, if elevated correlate with clinical findings before " administering antibiotic treatment.        Lactic Acid, Plasma [[id number]]  (Normal) Collected: 08/26/24 0644    Specimen: Blood Updated: 08/26/24 0716     Lactate 1.2 mmol/L     CBC Auto Differential [[id number]]  (Abnormal) Collected: 08/26/24 0644    Specimen: Blood Updated: 08/26/24 0658     WBC 8.56 10*3/mm3      RBC 4.82 10*6/mm3      Hemoglobin 13.8 g/dL      Hematocrit 42.3 %      MCV 87.8 fL      MCH 28.6 pg      MCHC 32.6 g/dL      RDW 15.0 %      RDW-SD 48.4 fl      MPV 10.4 fL      Platelets 305 10*3/mm3      Neutrophil % 68.1 %      Lymphocyte % 15.8 %      Monocyte % 9.2 %      Eosinophil % 5.3 %      Basophil % 0.8 %      Immature Grans % 0.8 %      Neutrophils, Absolute 5.83 10*3/mm3      Lymphocytes, Absolute 1.35 10*3/mm3      Monocytes, Absolute 0.79 10*3/mm3      Eosinophils, Absolute 0.45 10*3/mm3      Basophils, Absolute 0.07 10*3/mm3      Immature Grans, Absolute 0.07 10*3/mm3      nRBC 0.0 /100 WBC              Imaging:    No Radiology Exams Resulted Within Past 24 Hours      Differential Diagnosis and Discussion:    Extremity Pain: Differential diagnosis includes but is not limited to soft tissue sprain, tendonitis, tendon injury, dislocation, fracture, deep vein thrombosis, arterial insufficiency, osteoarthritis, bursitis, and ligamentous damage.    All labs were reviewed and interpreted by me.    MDM     Amount and/or Complexity of Data Reviewed  Clinical lab tests: reviewed             This patient is a 64-year-old male presenting with wound infection and cellulitis of the right lower extremity.    Otherwise he is not toxic appearing and not septic appearing and afebrile.    I check screening lab work and he has a normal white blood cell count and normal lactic acid and procalcitonin level.    I do not think he needs to be admitted for IV antibiotics.    I do not detect any skin abscess or necrotizing fasciitis but it looks like he has some chronic skin breakdown of the right lower leg and  now a cellulitis that can be treated with outpatient antibiotics.    I will start him on Keflex and also some doxycycline for MRSA coverage and perform a wound culture.    I will also place ambulatory referral for him to follow-up at the wound care clinic for this chronic wound.                Patient Care Considerations:          Consultants/Shared Management Plan:        Social Determinants of Health:    Patient is independent, reliable, and has access to care.       Disposition and Care Coordination:    Discharged: The patient is suitable and stable for discharge with no need for consideration of admission.    I have explained the patient´s condition, diagnoses and treatment plan based on the information available to me at this time. I have answered questions and addressed any concerns. The patient has a good  understanding of the patient´s diagnosis, condition, and treatment plan as can be expected at this point. The vital signs have been stable. The patient´s condition is stable and appropriate for discharge from the emergency department.      The patient will pursue further outpatient evaluation with the primary care physician or other designated or consulting physician as outlined in the discharge instructions. They are agreeable to this plan of care and follow-up instructions have been explained in detail. The patient has received these instructions in written format and has expressed an understanding of the discharge instructions. The patient is aware that any significant change in condition or worsening of symptoms should prompt an immediate return to this or the closest emergency department or call to 911.  I have explained discharge medications and the need for follow up with the patient/caretakers. This was also printed in the discharge instructions. Patient was discharged with the following medications and follow up:      Medication List        New Prescriptions      cephalexin 500 MG capsule  Commonly  known as: KEFLEX  Take 1 capsule by mouth 3 (Three) Times a Day for 7 days.     doxycycline 100 MG capsule  Commonly known as: MONODOX  Take 1 capsule by mouth 2 (Two) Times a Day.     HYDROcodone-acetaminophen 7.5-325 MG per tablet  Commonly known as: NORCO  Take 1 tablet by mouth Every 6 (Six) Hours As Needed for Severe Pain.               Where to Get Your Medications        These medications were sent to [facility]/pharmacy #[id number] - [place], [place] - [address] - [phone] [facility] [phone]   [address] [place] [address]      Hours: 24-hours Phone: [phone]   cephalexin 500 MG capsule  doxycycline 100 MG capsule  HYDROcodone-acetaminophen 7.5-325 MG per tablet      Uma Phan MD  [address]  [place] [id number] [phone]    Call today  for a follow-up appointment       Final diagnoses:   Cellulitis of right leg   Wound infection        ED Disposition       ED Disposition   Discharge    Condition   Stable    Comment   --               This medical record created using voice recognition software.             Martin Morse MD  08/26/24 0832

## 2024-08-29 ENCOUNTER — OFFICE VISIT (OUTPATIENT)
Dept: WOUND CARE | Facility: HOSPITAL | Age: 64
End: 2024-08-29
Payer: MEDICARE

## 2024-08-29 ENCOUNTER — HOSPITAL ENCOUNTER (OUTPATIENT)
Dept: CARDIOLOGY | Facility: HOSPITAL | Age: 64
Discharge: HOME | End: 2024-08-29
Payer: MEDICARE

## 2024-08-29 VITALS
RESPIRATION RATE: 18 BRPM | DIASTOLIC BLOOD PRESSURE: 90 MMHG | TEMPERATURE: 97.1 F | SYSTOLIC BLOOD PRESSURE: 160 MMHG | HEART RATE: 97 BPM

## 2024-08-29 DIAGNOSIS — L97.311 VENOUS STASIS ULCER OF RIGHT ANKLE LIMITED TO BREAKDOWN OF SKIN WITH VARICOSE VEINS: ICD-10-CM

## 2024-08-29 DIAGNOSIS — I83.018 VENOUS STASIS ULCER OF OTHER PART OF RIGHT LOWER LEG LIMITED TO BREAKDOWN OF SKIN WITH VARICOSE VEINS: ICD-10-CM

## 2024-08-29 DIAGNOSIS — T14.8XXA WOUND INFECTION: ICD-10-CM

## 2024-08-29 DIAGNOSIS — L03.115 CELLULITIS OF RIGHT LOWER EXTREMITY WITHOUT FOOT: ICD-10-CM

## 2024-08-29 DIAGNOSIS — R60.0 EDEMA OF RIGHT LOWER EXTREMITY: ICD-10-CM

## 2024-08-29 DIAGNOSIS — I87.2 VENOUS STASIS DERMATITIS OF BOTH LOWER EXTREMITIES: ICD-10-CM

## 2024-08-29 DIAGNOSIS — L08.9 WOUND INFECTION: ICD-10-CM

## 2024-08-29 DIAGNOSIS — R52 PAIN ASSOCIATED WITH WOUND: ICD-10-CM

## 2024-08-29 DIAGNOSIS — L97.811 VENOUS STASIS ULCER OF OTHER PART OF RIGHT LOWER LEG LIMITED TO BREAKDOWN OF SKIN WITH VARICOSE VEINS: ICD-10-CM

## 2024-08-29 DIAGNOSIS — I83.013 VENOUS STASIS ULCER OF RIGHT ANKLE LIMITED TO BREAKDOWN OF SKIN WITH VARICOSE VEINS: Primary | ICD-10-CM

## 2024-08-29 DIAGNOSIS — I83.013 VENOUS STASIS ULCER OF RIGHT ANKLE LIMITED TO BREAKDOWN OF SKIN WITH VARICOSE VEINS: ICD-10-CM

## 2024-08-29 DIAGNOSIS — L97.311 VENOUS STASIS ULCER OF RIGHT ANKLE LIMITED TO BREAKDOWN OF SKIN WITH VARICOSE VEINS: Primary | ICD-10-CM

## 2024-08-29 DIAGNOSIS — T14.8XXA PAIN ASSOCIATED WITH WOUND: ICD-10-CM

## 2024-08-29 DIAGNOSIS — M06.9 RHEUMATOID ARTHRITIS INVOLVING MULTIPLE SITES, UNSPECIFIED WHETHER RHEUMATOID FACTOR PRESENT: Chronic | ICD-10-CM

## 2024-08-29 LAB
BACTERIA SPEC AEROBE CULT: ABNORMAL
BH CV LOWER VASCULAR LEFT COMMON FEMORAL AUGMENT: NORMAL
BH CV LOWER VASCULAR LEFT COMMON FEMORAL COMPETENT: NORMAL
BH CV LOWER VASCULAR LEFT COMMON FEMORAL COMPRESS: NORMAL
BH CV LOWER VASCULAR LEFT COMMON FEMORAL PHASIC: NORMAL
BH CV LOWER VASCULAR LEFT COMMON FEMORAL SPONT: NORMAL
BH CV LOWER VASCULAR RIGHT COMMON FEMORAL AUGMENT: NORMAL
BH CV LOWER VASCULAR RIGHT COMMON FEMORAL COMPETENT: NORMAL
BH CV LOWER VASCULAR RIGHT COMMON FEMORAL COMPRESS: NORMAL
BH CV LOWER VASCULAR RIGHT COMMON FEMORAL PHASIC: NORMAL
BH CV LOWER VASCULAR RIGHT COMMON FEMORAL SPONT: NORMAL
BH CV LOWER VASCULAR RIGHT DISTAL FEMORAL COMPRESS: NORMAL
BH CV LOWER VASCULAR RIGHT GASTRONEMIUS COMPRESS: NORMAL
BH CV LOWER VASCULAR RIGHT GREATER SAPH AK COMPRESS: NORMAL
BH CV LOWER VASCULAR RIGHT GREATER SAPH BK COMPRESS: NORMAL
BH CV LOWER VASCULAR RIGHT LESSER SAPH COMPRESS: NORMAL
BH CV LOWER VASCULAR RIGHT MID FEMORAL AUGMENT: NORMAL
BH CV LOWER VASCULAR RIGHT MID FEMORAL COMPETENT: NORMAL
BH CV LOWER VASCULAR RIGHT MID FEMORAL COMPRESS: NORMAL
BH CV LOWER VASCULAR RIGHT MID FEMORAL PHASIC: NORMAL
BH CV LOWER VASCULAR RIGHT MID FEMORAL SPONT: NORMAL
BH CV LOWER VASCULAR RIGHT PERONEAL COMPRESS: NORMAL
BH CV LOWER VASCULAR RIGHT POPLITEAL AUGMENT: NORMAL
BH CV LOWER VASCULAR RIGHT POPLITEAL COMPETENT: NORMAL
BH CV LOWER VASCULAR RIGHT POPLITEAL COMPRESS: NORMAL
BH CV LOWER VASCULAR RIGHT POPLITEAL PHASIC: NORMAL
BH CV LOWER VASCULAR RIGHT POPLITEAL SPONT: NORMAL
BH CV LOWER VASCULAR RIGHT POSTERIOR TIBIAL COMPRESS: NORMAL
BH CV LOWER VASCULAR RIGHT PROXIMAL FEMORAL COMPRESS: NORMAL
BH CV LOWER VASCULAR RIGHT SAPHENOFEMORAL JUNCTION COMPRESS: NORMAL
GRAM STN SPEC: ABNORMAL
GRAM STN SPEC: ABNORMAL

## 2024-08-29 PROCEDURE — 1159F MED LIST DOCD IN RCRD: CPT | Performed by: EMERGENCY MEDICINE

## 2024-08-29 PROCEDURE — 1160F RVW MEDS BY RX/DR IN RCRD: CPT | Performed by: EMERGENCY MEDICINE

## 2024-08-29 PROCEDURE — 93971 EXTREMITY STUDY: CPT

## 2024-08-29 RX ORDER — HYDROCODONE BITARTRATE AND ACETAMINOPHEN 7.5; 325 MG/1; MG/1
1 TABLET ORAL EVERY 6 HOURS PRN
Qty: 30 TABLET | Refills: 0 | Status: SHIPPED | OUTPATIENT
Start: 2024-08-29 | End: 2024-08-29

## 2024-08-29 RX ORDER — NAPROXEN SODIUM 220 MG
220 TABLET ORAL 2 TIMES DAILY PRN
COMMUNITY

## 2024-08-29 RX ORDER — CIPROFLOXACIN 750 MG/1
750 TABLET, FILM COATED ORAL 2 TIMES DAILY
Qty: 20 TABLET | Refills: 0 | Status: SHIPPED | OUTPATIENT
Start: 2024-08-29 | End: 2024-09-08

## 2024-08-29 RX ORDER — HYDROCODONE BITARTRATE AND ACETAMINOPHEN 7.5; 325 MG/1; MG/1
1 TABLET ORAL EVERY 6 HOURS PRN
Qty: 30 TABLET | Refills: 0 | Status: SHIPPED | OUTPATIENT
Start: 2024-08-29

## 2024-08-29 NOTE — PROGRESS NOTES
Chief Complaint  Wound Check (New pt, wound to RLE, pt states the wounds started approximately 1 month ago, pt went to ED on 8/26/24, pt taking ABX (Keflex & Doxy), culture obtained in ED. Pt applying KERRY and wrapping. Pt has a cam boot to left foot that is being treated by Dr. Rose. )    Subjective      History of Present Illness    Dank Johnson  is a 64 y.o. male who presented to the ED on 08/26/2024 for evaluation of a chronic RLE wound that he felt like was becoming infected.  Labs and wound culture were done and patient was started on Keflex and doxycycline.  Wound culture finalized and growing moderate MRSA as well as light growth of Pseudomonas and Myroides.    Patient is not a diabetic but does have a history of rheumatoid arthritis but is not on any medication for it.  He says he saw a rheumatology many years ago but had a reaction to Remicade and never tried any other medication.  He has not seen a rheumatologist in a long time.    Patient has also had a wound on the plantar left foot for about a year that he sees Dr. Bloom for.  The wound has gotten quite small and he is applying triple antibiotic to that.  Patient says he has been told that he has neuropathy.    Patient has no history of DVT or PE.  He has not had an ultrasound since he developed the swelling in his legs.    Allergies:  Patient has no known allergies.      Current Outpatient Medications:     cephalexin (KEFLEX) 500 MG capsule, Take 1 capsule by mouth 3 (Three) Times a Day for 7 days., Disp: 21 capsule, Rfl: 0    doxycycline (MONODOX) 100 MG capsule, Take 1 capsule by mouth 2 (Two) Times a Day., Disp: 14 capsule, Rfl: 0    HYDROcodone-acetaminophen (NORCO) 7.5-325 MG per tablet, Take 1 tablet by mouth Every 6 (Six) Hours As Needed for Severe Pain., Disp: 12 tablet, Rfl: 0    HYDROcodone-acetaminophen (Norco) 7.5-325 MG per tablet, Take 1 tablet by mouth Every 6 (Six) Hours As Needed for Severe Pain., Disp: 30 tablet, Rfl: 0    naproxen  sodium (ALEVE) 220 MG tablet, Take 1 tablet by mouth 2 (Two) Times a Day As Needed for Mild Pain., Disp: , Rfl:     ciprofloxacin (CIPRO) 750 MG tablet, Take 1 tablet by mouth 2 (Two) Times a Day for 10 days., Disp: 20 tablet, Rfl: 0    KLOR-CON 20 MEQ CR tablet, 1 TAB(S) ORALLY ONCE DAILY 7 DAYS (Patient not taking: Reported on 8/29/2024), Disp: , Rfl:     Santyl 250 UNIT/GM ointment, APPLY TO AFFECTED AREA TOPICALLY ONCE DAILY (Patient not taking: Reported on 8/29/2024), Disp: , Rfl:     zinc sulfate (ZINCATE) 220 (50 Zn) MG capsule, TAKE 1 CAPSULE BY MOUTH THREE TIMES A DAY FOR 30 DAYS (Patient not taking: Reported on 8/29/2024), Disp: , Rfl:     Past Medical History:   Diagnosis Date    Arthritis     Foot infection      Past Surgical History:   Procedure Laterality Date    COLONOSCOPY N/A 06/02/2023    Procedure: COLONOSCOPY WITH POLYPECTOMIES;  Surgeon: Pardeep Ramirez MD;  Location: [facility] ENDOSCOPY;  Service: Gastroenterology;  Laterality: N/A;  COLON POLYPS    CORNEAL TRANSPLANT Right     x2  - transplant did not take    EYE SURGERY Right     cornea transplant    HAND SURGERY      ganglion cyst     Social History     Socioeconomic History    Marital status:    Tobacco Use    Smoking status: Every Day     Current packs/day: 1.00     Average packs/day: 1 pack/day for 47.7 years (47.7 ttl pk-yrs)     Types: Cigarettes     Start date: 1/1/1977    Smokeless tobacco: Never   Vaping Use    Vaping status: Never Used   Substance and Sexual Activity    Alcohol use: Yes     Alcohol/week: 1.0 standard drink of alcohol     Types: 1 Glasses of wine per week     Comment: rafa every 3 months    Drug use: Not Currently     Comment: when he was younger    Sexual activity: Yes     Partners: Female     Birth control/protection: Condom           Objective     Vitals:    08/29/24 0847   BP: 160/90   BP Location: Left arm   Patient Position: Sitting   Cuff Size: Adult   Pulse: 97   Resp: 18  Comment: 95% RA   Temp:  97.1 °F (36.2 °C)   TempSrc: Temporal   PainSc: 10-Worst pain ever   PainLoc: Leg  Comment: right     There is no height or weight on file to calculate BMI.    STEADI Fall Risk Assessment has not been completed.     Review of Systems     ROS:  Per HPI.     I have reviewed the HPI and ROS as documented by MA/RN. Uma Phan MD    Physical Exam     NAD  AAOx3, pleasant, cooperative  Edema with venous stasis changes and dermatitis BLE, R>>L  Varicosities and distal telangiectasias noted BLE.  RLE with severe edema, warmth, redness, and lymphedema.  Patient has extensive ulcerations almost circumferentially around the lower leg with slough in the bases along the medial and lateral aspects of the ankle and leg.  All wounds are exquisitely TTP.  Feet warm pink and dry with brisk capillary refill distally.    LLE with very small healing plantar wound with mild maceration.  Angular deformities of toes of both feet with flattened arches.    See photos for details.                        Result Review :  The following data was reviewed by: Uma Phan MD on 08/29/2024:    Remote arterial Doppler, left foot x-ray, left foot MRI.  ED note, ED wound culture, CBC, CMP, lactic acid, procalcitonin.             Assessment and Plan   Diagnoses and all orders for this visit:    1. Venous stasis ulcer of right ankle limited to breakdown of skin with varicose veins (Primary)  -     Duplex Venous Lower Extremity - Right CAR; Future  -     Discontinue: HYDROcodone-acetaminophen (Norco) 7.5-325 MG per tablet; Take 1 tablet by mouth Every 6 (Six) Hours As Needed for Severe Pain.  Dispense: 30 tablet; Refill: 0  -     Ambulatory Referral to Home Health  -     HYDROcodone-acetaminophen (Norco) 7.5-325 MG per tablet; Take 1 tablet by mouth Every 6 (Six) Hours As Needed for Severe Pain.  Dispense: 30 tablet; Refill: 0  -     Bandage UNNABoot 4IN 10YD    2. Venous stasis ulcer of other part of right lower leg limited to breakdown of skin  with varicose veins  -     Duplex Venous Lower Extremity - Right CAR; Future  -     Discontinue: HYDROcodone-acetaminophen (Norco) 7.5-325 MG per tablet; Take 1 tablet by mouth Every 6 (Six) Hours As Needed for Severe Pain.  Dispense: 30 tablet; Refill: 0  -     Ambulatory Referral to Home Health  -     HYDROcodone-acetaminophen (Norco) 7.5-325 MG per tablet; Take 1 tablet by mouth Every 6 (Six) Hours As Needed for Severe Pain.  Dispense: 30 tablet; Refill: 0    3. Venous stasis dermatitis of both lower extremities    4. Cellulitis of right lower extremity without foot  -     ciprofloxacin (CIPRO) 750 MG tablet; Take 1 tablet by mouth 2 (Two) Times a Day for 10 days.  Dispense: 20 tablet; Refill: 0    5. Wound infection  -     ciprofloxacin (CIPRO) 750 MG tablet; Take 1 tablet by mouth 2 (Two) Times a Day for 10 days.  Dispense: 20 tablet; Refill: 0    6. Rheumatoid arthritis involving multiple sites, unspecified whether rheumatoid factor present    7. Pain associated with wound  -     Discontinue: HYDROcodone-acetaminophen (Norco) 7.5-325 MG per tablet; Take 1 tablet by mouth Every 6 (Six) Hours As Needed for Severe Pain.  Dispense: 30 tablet; Refill: 0  -     HYDROcodone-acetaminophen (Norco) 7.5-325 MG per tablet; Take 1 tablet by mouth Every 6 (Six) Hours As Needed for Severe Pain.  Dispense: 30 tablet; Refill: 0    8. Edema of right lower extremity  -     Duplex Venous Lower Extremity - Right CAR; Future          Patient with severe swelling with wounds RLE new within the last month or so.  Venous Doppler ordered and urgently scheduled to rule out DVT.  Fortunately no DVT was found today.    Wound culture from the ED is growing MRSA and Myroides susceptible to the doxycycline he is on.  However, he also is growing Pseudomonas from the wounds which is intermediate to levofloxacin but thankfully susceptible to ciprofloxacin.  Patient has taken this medication before without difficulty.  They are advised to  discontinue the cephalexin and start ciprofloxacin.  Patient is advised to monitor closely for any side effects, particularly tendinopathy or pain and is instructed to stop the medication immediately and contact us should he experience any difficulties while taking the medication.  I discussed the patient with Collin Mcgrath PA-C from the ED to let him know that we were adjusting the medications based on the culture results so that they did not accidentally duplicate prescriptions.    Recommend Aquacel Ag to the wounds and wrapped with an Unna boot.  Areas of drainage should be reinforced with ABD pads and secured with an elastic bandage such as an Ace wrap.  Patient's family will likely need to change the ABD pads and elastic wrap regularly in between visits when they become saturated with drainage given the amount of drainage she has been experiencing recently.    We will get the patient set up for home health visits for dressing changes.  It would be ideal if he could get dressings 3 times a week initially at least for the first 1 to 2 weeks and then they could drop back to twice a week.    I had a long conversation with the patient and his family about the importance of elevation of his lower extremities at all times to decrease the swelling and improve chances of healing.  This will also help diminish the pain he is experiencing, as will getting better control of the infection.  He has had only minimal relief from the hydrocodone provided from the ED.  I will prescribe him an additional 30 hydrocodone to try and help him tolerate the wraps and dressing changes better while the wounds are so severe.    Smoking cessation is strongly advisable in order to improve patient's chances of healing.    Recommend healthy diet with increased protein intake to aid healing.    Recheck 2 weeks.    Patient was given instructions and counseling regarding their condition or for health maintenance advice, as well as the wound  care plan and recommendations. They understand and agree with the plan.  They will follow back up here in the clinic but are instructed to contact us in the interim should they have any new, returning, or worsening symptoms or concerns. Please see specific information pulled into the AVS if appropriate.     Dragon Dictation utilized for chart completion.    I spent 75 minutes in both face-to-face and nonface-to-face time for patient care today including, but not limited to, review of old records, reviewing old images, history and physical exam, reviewing test results, counseling patient, entering orders, coordinating care, and documenting.      Follow Up   Return in about 2 weeks (around 9/12/2024).      Uma Phan MD

## 2024-09-12 ENCOUNTER — OFFICE VISIT (OUTPATIENT)
Dept: WOUND CARE | Facility: HOSPITAL | Age: 64
End: 2024-09-12
Payer: MEDICARE

## 2024-09-12 VITALS
HEART RATE: 93 BPM | RESPIRATION RATE: 18 BRPM | DIASTOLIC BLOOD PRESSURE: 80 MMHG | TEMPERATURE: 97.4 F | SYSTOLIC BLOOD PRESSURE: 148 MMHG

## 2024-09-12 DIAGNOSIS — I83.018 VENOUS STASIS ULCER OF OTHER PART OF RIGHT LOWER LEG LIMITED TO BREAKDOWN OF SKIN WITH VARICOSE VEINS: ICD-10-CM

## 2024-09-12 DIAGNOSIS — I87.2 VENOUS STASIS DERMATITIS OF BOTH LOWER EXTREMITIES: ICD-10-CM

## 2024-09-12 DIAGNOSIS — I83.013 VENOUS STASIS ULCER OF RIGHT ANKLE LIMITED TO BREAKDOWN OF SKIN WITH VARICOSE VEINS: Primary | ICD-10-CM

## 2024-09-12 DIAGNOSIS — M06.9 RHEUMATOID ARTHRITIS INVOLVING MULTIPLE SITES, UNSPECIFIED WHETHER RHEUMATOID FACTOR PRESENT: ICD-10-CM

## 2024-09-12 DIAGNOSIS — L97.311 VENOUS STASIS ULCER OF RIGHT ANKLE LIMITED TO BREAKDOWN OF SKIN WITH VARICOSE VEINS: Primary | ICD-10-CM

## 2024-09-12 DIAGNOSIS — L97.811 VENOUS STASIS ULCER OF OTHER PART OF RIGHT LOWER LEG LIMITED TO BREAKDOWN OF SKIN WITH VARICOSE VEINS: ICD-10-CM

## 2024-09-12 PROCEDURE — 1159F MED LIST DOCD IN RCRD: CPT | Performed by: EMERGENCY MEDICINE

## 2024-09-12 PROCEDURE — 1160F RVW MEDS BY RX/DR IN RCRD: CPT | Performed by: EMERGENCY MEDICINE

## 2024-09-12 RX ORDER — CHOLECALCIFEROL (VITAMIN D3) 125 MCG
50 TABLET ORAL 2 TIMES WEEKLY
COMMUNITY

## 2024-09-12 NOTE — PROGRESS NOTES
Chief Complaint  Wound Check (Wound check to RLE, pt continues unna boot changes 2x weekly, pt states the wounds are getting better, completed ABX (Doxycycline & Cipro).  )    Subjective      History of Present Illness    Dank Johnson  is a 64 y.o. male who presented to the ED on 08/26/2024 for evaluation of a chronic RLE wound that he felt like was becoming infected.  Labs and wound culture were done and patient was started on Keflex and doxycycline.  Wound culture finalized and growing moderate MRSA as well as light growth of Pseudomonas and Myroides.    Patient is not a diabetic but does have a history of rheumatoid arthritis but is not on any medication for it.  He says he saw a rheumatology many years ago but had a reaction to Remicade and never tried any other medication.  He has not seen a rheumatologist in a long time.    Patient has also had a wound on the plantar left foot for about a year that he sees Dr. Bloom for.  The wound has gotten quite small and he is applying triple antibiotic to that.  Patient says he has been told that he has neuropathy.    Patient has no history of DVT or PE.  He has not had an ultrasound since he developed the swelling in his legs.    He has been getting Aquacel Ag and an Unna boot twice a week and is doing much better.    Allergies:  Patient has no known allergies.      Current Outpatient Medications:     Ergocalciferol (Vitamin D2) 50 MCG (2000 UT) tablet, Take 50 mcg by mouth 2 (Two) Times a Week., Disp: , Rfl:     HYDROcodone-acetaminophen (NORCO) 7.5-325 MG per tablet, Take 1 tablet by mouth Every 6 (Six) Hours As Needed for Severe Pain., Disp: 12 tablet, Rfl: 0    HYDROcodone-acetaminophen (Norco) 7.5-325 MG per tablet, Take 1 tablet by mouth Every 6 (Six) Hours As Needed for Severe Pain., Disp: 30 tablet, Rfl: 0    naproxen sodium (ALEVE) 220 MG tablet, Take 1 tablet by mouth 2 (Two) Times a Day As Needed for Mild Pain., Disp: , Rfl:     Past Medical History:    Diagnosis Date    Arthritis     Foot infection      Past Surgical History:   Procedure Laterality Date    COLONOSCOPY N/A 06/02/2023    Procedure: COLONOSCOPY WITH POLYPECTOMIES;  Surgeon: Pardeep Ramirez MD;  Location: [facility] ENDOSCOPY;  Service: Gastroenterology;  Laterality: N/A;  COLON POLYPS    CORNEAL TRANSPLANT Right     x2  - transplant did not take    EYE SURGERY Right     cornea transplant    HAND SURGERY      ganglion cyst     Social History     Socioeconomic History    Marital status:    Tobacco Use    Smoking status: Every Day     Current packs/day: 1.00     Average packs/day: 1 pack/day for 47.7 years (47.7 ttl pk-yrs)     Types: Cigarettes     Start date: 1/1/1977    Smokeless tobacco: Never   Vaping Use    Vaping status: Never Used   Substance and Sexual Activity    Alcohol use: Yes     Alcohol/week: 1.0 standard drink of alcohol     Types: 1 Glasses of wine per week     Comment: mayabe every 3 months    Drug use: Not Currently     Comment: when he was younger    Sexual activity: Yes     Partners: Female     Birth control/protection: Condom           Objective     Vitals:    09/12/24 0955   BP: 148/80   BP Location: Left arm   Patient Position: Sitting   Cuff Size: Adult   Pulse: 93   Resp: 18  Comment: 93% RA   Temp: 97.4 °F (36.3 °C)   TempSrc: Temporal   PainSc:   5   PainLoc: Leg  Comment: right     There is no height or weight on file to calculate BMI.    STEADI Fall Risk Assessment has not been completed.     Review of Systems     ROS:  Per HPI.     I have reviewed the HPI and ROS as documented by MA/RN. Uma Phan MD    Physical Exam     NAD  AAOx3, pleasant, cooperative  Edema with venous stasis changes and dermatitis, improving     RLE with dramatic improvement.  Wounds have improved significantly as has the edema and tenderness.  There is no longer any active drainage, skin is extremely dry and flaking.    See photos for  details.    RLE:                                      Result Review :  The following data was reviewed by: Uma Phan MD on 09/12/2024:    Prior [facility] notes and images.             Assessment and Plan   Diagnoses and all orders for this visit:    1. Venous stasis ulcer of right ankle limited to breakdown of skin with varicose veins (Primary)  -     Bandage UNNABoot 4IN 10YD    2. Venous stasis ulcer of other part of right lower leg limited to breakdown of skin with varicose veins  -     Bandage UNNABoot 4IN 10YD    3. Venous stasis dermatitis of both lower extremities    4. Rheumatoid arthritis involving multiple sites, unspecified whether rheumatoid factor present        Patient's wounds are dramatically improved.  Recommend aggressively moist rising all intact skin of his legs with Aquaphor or Vaseline applied in a fairly thick layer prior to dressing application.  Hydrofera Blue should be applied to open wounds and RLE wrapped with an Unna boot and secured with elastic bandage.    Smoking cessation is strongly advisable in order to improve patient's chances of healing.    Recommend healthy diet with increased protein intake to aid healing.    Recheck 4 weeks.    Patient was given instructions and counseling regarding their condition or for health maintenance advice, as well as the wound care plan and recommendations. They understand and agree with the plan.  They will follow back up here in the clinic but are instructed to contact us in the interim should they have any new, returning, or worsening symptoms or concerns. Please see specific information pulled into the AVS if appropriate.     Dragon Dictation utilized for chart completion.       Follow Up   Return in about 4 weeks (around 10/10/2024).      Uma Phan MD

## 2024-10-09 ENCOUNTER — OFFICE VISIT (OUTPATIENT)
Dept: WOUND CARE | Facility: HOSPITAL | Age: 64
End: 2024-10-09
Payer: MEDICARE

## 2024-10-09 VITALS
TEMPERATURE: 97.6 F | DIASTOLIC BLOOD PRESSURE: 86 MMHG | RESPIRATION RATE: 18 BRPM | SYSTOLIC BLOOD PRESSURE: 160 MMHG | HEART RATE: 93 BPM

## 2024-10-09 DIAGNOSIS — R60.0 EDEMA OF RIGHT LOWER EXTREMITY: ICD-10-CM

## 2024-10-09 DIAGNOSIS — M06.9 RHEUMATOID ARTHRITIS INVOLVING MULTIPLE SITES, UNSPECIFIED WHETHER RHEUMATOID FACTOR PRESENT: ICD-10-CM

## 2024-10-09 DIAGNOSIS — I87.2 VENOUS STASIS DERMATITIS OF BOTH LOWER EXTREMITIES: Primary | ICD-10-CM

## 2024-10-09 PROCEDURE — G0463 HOSPITAL OUTPT CLINIC VISIT: HCPCS | Performed by: EMERGENCY MEDICINE

## 2024-10-09 PROCEDURE — 1160F RVW MEDS BY RX/DR IN RCRD: CPT | Performed by: EMERGENCY MEDICINE

## 2024-10-09 PROCEDURE — 1159F MED LIST DOCD IN RCRD: CPT | Performed by: EMERGENCY MEDICINE

## 2024-10-09 NOTE — PROGRESS NOTES
Chief Complaint  Wound Check (Follow up visit for venous ulcer to E.  Patient is not diabetic.  Patient was using hydrofera blue and unna boot.  Patient states he has not been wearing an unna boot for a couple of weeks due to area being healed.)    Subjective      History of Present Illness    Dank Johnson  is a 64 y.o. male who presented to the ED on 08/26/2024 for evaluation of a chronic RLE wound that he felt like was becoming infected.  Labs and wound culture were done and patient was started on Keflex and doxycycline.  Wound culture finalized and growing moderate MRSA as well as light growth of Pseudomonas and Myroides.    Patient is not a diabetic but does have a history of rheumatoid arthritis but is not on any medication for it.  He says he saw a rheumatology many years ago but had a reaction to Remicade and never tried any other medication.  He has not seen a rheumatologist in a long time.    Patient has also had a wound on the plantar left foot for about a year that he sees Dr. Bloom for.  The wound has gotten quite small and he is applying triple antibiotic to that.  Patient says he has been told that he has neuropathy.    Patient has no history of DVT or PE.  He has not had an ultrasound since he developed the swelling in his legs.    He had been getting Aquacel Ag and an Unna boot twice a week with home health but he says they stopped doing that about 2 weeks ago because the wounds were healed.  His leg has been getting wrapped with an Ace bandage to help with the swelling.    Allergies:  Patient has no known allergies.      Current Outpatient Medications:     Ergocalciferol (Vitamin D2) 50 MCG (2000 UT) tablet, Take 50 mcg by mouth 2 (Two) Times a Week., Disp: , Rfl:     HYDROcodone-acetaminophen (NORCO) 7.5-325 MG per tablet, Take 1 tablet by mouth Every 6 (Six) Hours As Needed for Severe Pain., Disp: 12 tablet, Rfl: 0    HYDROcodone-acetaminophen (Norco) 7.5-325 MG per tablet, Take 1 tablet by  mouth Every 6 (Six) Hours As Needed for Severe Pain., Disp: 30 tablet, Rfl: 0    naproxen sodium (ALEVE) 220 MG tablet, Take 1 tablet by mouth 2 (Two) Times a Day As Needed for Mild Pain., Disp: , Rfl:     Past Medical History:   Diagnosis Date    Arthritis     Foot infection      Past Surgical History:   Procedure Laterality Date    COLONOSCOPY N/A 06/02/2023    Procedure: COLONOSCOPY WITH POLYPECTOMIES;  Surgeon: Pardeep Ramirez MD;  Location: [facility] ENDOSCOPY;  Service: Gastroenterology;  Laterality: N/A;  COLON POLYPS    CORNEAL TRANSPLANT Right     x2  - transplant did not take    EYE SURGERY Right     cornea transplant    HAND SURGERY      ganglion cyst     Social History     Socioeconomic History    Marital status:    Tobacco Use    Smoking status: Every Day     Current packs/day: 1.00     Average packs/day: 1 pack/day for 47.8 years (47.8 ttl pk-yrs)     Types: Cigarettes     Start date: 1/1/1977    Smokeless tobacco: Never   Vaping Use    Vaping status: Never Used   Substance and Sexual Activity    Alcohol use: Yes     Alcohol/week: 1.0 standard drink of alcohol     Types: 1 Glasses of wine per week     Comment: rafa every 3 months    Drug use: Not Currently     Comment: when he was younger    Sexual activity: Yes     Partners: Female     Birth control/protection: Condom           Objective     Vitals:    10/09/24 1043   BP: 160/86   BP Location: Left arm   Patient Position: Sitting   Cuff Size: Adult   Pulse: 93   Resp: 18  Comment: 96% room air   Temp: 97.6 °F (36.4 °C)   TempSrc: Temporal   PainSc: 0-No pain     There is no height or weight on file to calculate BMI.    STEADI Fall Risk Assessment has not been completed.     Review of Systems     ROS:  Per HPI.     I have reviewed the HPI and ROS as documented by MA/RN. Uma Phan MD    Physical Exam     NAD  AAOx3, pleasant, cooperative  Edema with venous stasis changes and dermatitis, improving     RLE wounds have all healed.   Patient continues to have some edema and stasis discoloration but no tenderness or warmth to suggest infection.  Diffuse dry flaky skin still present.    See photos for details.    RLE:                      Result Review :  The following data was reviewed by: Uma Phan MD on 10/09/2024:    Prior [facility] notes and images.             Assessment and Plan   Diagnoses and all orders for this visit:    1. Venous stasis dermatitis of both lower extremities (Primary)    2. Rheumatoid arthritis involving multiple sites, unspecified whether rheumatoid factor present    3. Edema of right lower extremity        Wounds are healed but there is still evidence of significant venous stasis and extensive dry skin.  Recommend he aggressively moisturize with Aquaphor, Vaseline, or equivalent twice daily and resume compression stocking use or continue wrapping with Ace bandage.      Return as needed.    Patient was given instructions and counseling regarding their condition or for health maintenance advice, as well as the wound care plan and recommendations. They understand and agree with the plan.  They will follow back up here in the clinic but are instructed to contact us in the interim should they have any new, returning, or worsening symptoms or concerns. Please see specific information pulled into the AVS if appropriate.     Dragon Dictation utilized for chart completion.       Follow Up   Return if symptoms worsen or fail to improve.      Uma Phan MD

## 2024-11-04 ENCOUNTER — TELEPHONE (OUTPATIENT)
Dept: WOUND CARE | Facility: HOSPITAL | Age: 64
End: 2024-11-04
Payer: MEDICARE

## 2024-11-04 NOTE — TELEPHONE ENCOUNTER
ANNA Evans called to say that she went to see the patient on Friday and he did not want to extend services with them. She told him to just give them a call if the services were needed.

## 2025-04-14 ENCOUNTER — OFFICE VISIT (OUTPATIENT)
Dept: WOUND CARE | Facility: HOSPITAL | Age: 65
End: 2025-04-14
Payer: MEDICARE

## 2025-04-14 VITALS
DIASTOLIC BLOOD PRESSURE: 86 MMHG | TEMPERATURE: 99.6 F | RESPIRATION RATE: 18 BRPM | SYSTOLIC BLOOD PRESSURE: 145 MMHG | HEART RATE: 89 BPM

## 2025-04-14 DIAGNOSIS — L08.9 WOUND INFECTION: Primary | ICD-10-CM

## 2025-04-14 DIAGNOSIS — T14.8XXA WOUND INFECTION: Primary | ICD-10-CM

## 2025-04-14 DIAGNOSIS — L02.612 ABSCESS OF LEFT FOOT: ICD-10-CM

## 2025-04-14 DIAGNOSIS — S91.301A OPEN WOUND OF RIGHT FOOT, INITIAL ENCOUNTER: ICD-10-CM

## 2025-04-14 DIAGNOSIS — S91.302A OPEN WOUND OF LEFT FOOT, INITIAL ENCOUNTER: ICD-10-CM

## 2025-04-14 PROCEDURE — 87205 SMEAR GRAM STAIN: CPT | Performed by: EMERGENCY MEDICINE

## 2025-04-14 PROCEDURE — 87070 CULTURE OTHR SPECIMN AEROBIC: CPT | Performed by: EMERGENCY MEDICINE

## 2025-04-14 PROCEDURE — 87186 SC STD MICRODIL/AGAR DIL: CPT | Performed by: EMERGENCY MEDICINE

## 2025-04-14 PROCEDURE — 87076 CULTURE ANAEROBE IDENT EACH: CPT | Performed by: EMERGENCY MEDICINE

## 2025-04-14 PROCEDURE — 87147 CULTURE TYPE IMMUNOLOGIC: CPT | Performed by: EMERGENCY MEDICINE

## 2025-04-14 PROCEDURE — 1160F RVW MEDS BY RX/DR IN RCRD: CPT | Performed by: EMERGENCY MEDICINE

## 2025-04-14 PROCEDURE — 11042 DBRDMT SUBQ TIS 1ST 20SQCM/<: CPT | Performed by: EMERGENCY MEDICINE

## 2025-04-14 PROCEDURE — 1159F MED LIST DOCD IN RCRD: CPT | Performed by: EMERGENCY MEDICINE

## 2025-04-14 PROCEDURE — 99214 OFFICE O/P EST MOD 30 MIN: CPT | Performed by: EMERGENCY MEDICINE

## 2025-04-14 RX ORDER — DOXYCYCLINE 100 MG/1
100 CAPSULE ORAL 2 TIMES DAILY
Qty: 14 CAPSULE | Refills: 0 | Status: SHIPPED | OUTPATIENT
Start: 2025-04-14 | End: 2025-04-21

## 2025-04-14 NOTE — PROGRESS NOTES
Wound Location: Right lateral ankle    Wound Exudate: moderate  Wound Debridement: Mechanically debrided with normal saline & gauze  Wound Thickness: full     Cleanse with:  NS or antibacterial soap and water    Primary: Aquacel ag    Secondary: 4x4 gauze  Secured with: rolled gauze & elastic wrap   Caitlin-wound: dry     Instructions: Cleanse wound with ns or antibacterial soap and water, pat dry, apply aquacel ag to wound bed, cover with 4x4 gauze, secure with rolled gauze and elastic wrap . Change daily     Wound Location: Left plantar  Wound Exudate: moderate  Wound Debridement: sharps debrided by MARY Phan MD   Wound Thickness: full     Cleanse with:  NS or antibacterial soap and water    Primary: betadine moist gauze   Secondary: 4x4 gauze  Secured with: rolled gauze & elastic wrap   Caitlin-wound: dry     Instructions: Cleanse wound with ns or antibacterial soap and water, pat dry, betadine moistened gauze to wound bed, cover with 4x4 gauze, secure with rolled gauze and elastic wrap . Change daily     Education: Educated patient/family member/CG on how to apply new dressings, verbal understanding provided. Patient/family member/CG instructed to call with any questions or concerns

## 2025-04-14 NOTE — PROGRESS NOTES
Chief Complaint  Wound Check (Pt here today for a new wound to his right ankle. Pt states he hit his leg with a board last week (4/7/25). Pt treating wound with KERRY and bandage. Left foot has wound but they are being treated by Dr Rose with Podiatry. Pt sees podiatry 1x monthly,  - next appt Next Monday 4/21/25. Does not monitor CBG. No ABX. )    Subjective      History of Present Illness    Dank Johnson  is a 64 y.o. male     History of Present Illness  The patient is a 64-year-old male here for evaluation of lower extremity wounds. He has not been here since the fall of 2024, at which time his wounds had fully healed.    He has been under the care of Dr. Bloom for a wound on his left foot, with monthly visits. The treatment regimen includes debridement of the callus and application of antibiotic ointment. He reports that the wound was generally in good condition until this week when it deteriorated. He suspects an infection due to the presence of drainage. He receives assistance with dressing changes and finds it challenging to maintain dryness between his toes. He has an upcoming appointment with Dr. Bloom next Monday. He spends a significant amount of time seated but attempts to elevate his legs when possible. He reports no history of renal issues or diabetes. He is not currently on anticoagulant therapy.    Approximately 1.5 weeks ago, he sustained an injury to his right leg after scraping it against a board. He is uncertain if a concurrent injury occurred to his heel. He has been managing the wound with triple antibiotic ointment to the right foot. He reports significant pain in the left foot starting about a week ago. He also notes the frequent formation of blisters under the wound.    SOCIAL HISTORY  The patient smokes a pack a day.    ALLERGIES  The patient has no known allergies.      Allergies:  Patient has no known allergies.      Current Outpatient Medications:     doxycycline (VIBRAMYCIN) 100 MG capsule,  Take 1 capsule by mouth 2 (Two) Times a Day for 7 days. Do not take at the same time as any vitamin or mineral supplements., Disp: 14 capsule, Rfl: 0    Ergocalciferol (Vitamin D2) 50 MCG (2000 UT) tablet, Take 50 mcg by mouth 2 (Two) Times a Week. (Patient not taking: Reported on 4/14/2025), Disp: , Rfl:     HYDROcodone-acetaminophen (NORCO) 7.5-325 MG per tablet, Take 1 tablet by mouth Every 6 (Six) Hours As Needed for Severe Pain. (Patient not taking: Reported on 4/14/2025), Disp: 12 tablet, Rfl: 0    HYDROcodone-acetaminophen (Norco) 7.5-325 MG per tablet, Take 1 tablet by mouth Every 6 (Six) Hours As Needed for Severe Pain. (Patient not taking: Reported on 4/14/2025), Disp: 30 tablet, Rfl: 0    naproxen sodium (ALEVE) 220 MG tablet, Take 1 tablet by mouth 2 (Two) Times a Day As Needed for Mild Pain., Disp: , Rfl:     Past Medical History:   Diagnosis Date    Arthritis     Foot infection      Past Surgical History:   Procedure Laterality Date    COLONOSCOPY N/A 06/02/2023    Procedure: COLONOSCOPY WITH POLYPECTOMIES;  Surgeon: Pardeep Ramirez MD;  Location: [facility] ENDOSCOPY;  Service: Gastroenterology;  Laterality: N/A;  COLON POLYPS    CORNEAL TRANSPLANT Right     x2  - transplant did not take    EYE SURGERY Right     cornea transplant    HAND SURGERY      ganglion cyst     Social History     Socioeconomic History    Marital status:    Tobacco Use    Smoking status: Every Day     Current packs/day: 1.00     Average packs/day: 1 pack/day for 48.3 years (48.3 ttl pk-yrs)     Types: Cigarettes     Start date: 1/1/1977    Smokeless tobacco: Never   Vaping Use    Vaping status: Never Used   Substance and Sexual Activity    Alcohol use: Yes     Alcohol/week: 1.0 standard drink of alcohol     Types: 1 Glasses of wine per week     Comment: rafa every 3 months    Drug use: Not Currently     Comment: when he was younger    Sexual activity: Yes     Partners: Female     Birth control/protection: Condom            Objective     Vitals:    04/14/25 1516   BP: 145/86   BP Location: Right arm   Patient Position: Sitting   Cuff Size: Adult   Pulse: 89   Resp: 18  Comment: 92% RA   Temp: 99.6 °F (37.6 °C)   TempSrc: Temporal   PainSc: 8   Comment: stabbing pain   PainLoc: Leg  Comment: right     There is no height or weight on file to calculate BMI.    STEADI Fall Risk Assessment has not been completed.     Review of Systems     ROS:  Per HPI.     I have reviewed the HPI and ROS as documented by MA/RN. Uma Phan MD    Physical Exam     NAD  AAOx3, pleasant, cooperative      Physical Exam  There is a callus with an underlying wound and maceration on the left plantar midline foot at the distal and mid aspect. Palpation reveals expression of purulent bloody drainage. A culture was obtained. The wound was debrided and deep tunneling identified with severe tenderness. The cavity was irrigated copiously with sterile normal saline. Erythema, swelling, and warmth are present. There is 2+ pitting edema in both lower extremities. Faintly palpable, strongly dopplerable pulses are found in both DP and PT. The right lateral foot has a large shallow ulceration with thin slough and surrounding mild maceration. Severe tenderness is noted in the left plantar wound, while mild to moderate tenderness is observed in the right lateral foot wound.     RLE:            LLE:              Result Review :  The following data was reviewed by: Uma Phan MD on 04/14/2025:    Prior notes and images.      Wound debridement    Performed by: Uma Phan MD  Authorized by: Uma Phan MD  Associated wounds:   Wound 04/14/25 1544 Left plantar    Correct patient: Identification verified by two methods:     Verbally and Date of birth  Correct procedure/consent    Correct site/side    Correct equipment as applicable    How many wounds are you performing a debridement on?:  1  Wound 1:     Nursing Documentation:   Measurements:     The  total amount debrided on this wound was under 20 cm2.     Provider Documentation    Debridement type:  Sharp/excisional    Betadine       None    Tissue debrided:  Moderate    Type tissue:  Culture sent    Level removed:  Subcutaneous    Patient tolerance:  Good    Hemostasis achieved by:  Direct pressure and Silver nitrate    Wound Bed Post Debridement: See Photo            Assessment and Plan   Diagnoses and all orders for this visit:    1. Wound infection (Primary)  -     doxycycline (VIBRAMYCIN) 100 MG capsule; Take 1 capsule by mouth 2 (Two) Times a Day for 7 days. Do not take at the same time as any vitamin or mineral supplements.  Dispense: 14 capsule; Refill: 0    2. Abscess of left foot  -     doxycycline (VIBRAMYCIN) 100 MG capsule; Take 1 capsule by mouth 2 (Two) Times a Day for 7 days. Do not take at the same time as any vitamin or mineral supplements.  Dispense: 14 capsule; Refill: 0  -     Wound Culture - Wound, Foot, Left    3. Open wound of left foot, initial encounter  -     Wound Culture - Wound, Foot, Left  -     Wound debridement    4. Open wound of right foot, initial encounter        Assessment & Plan  1. Left foot wound.  The wound on the left foot appears infected with drainage and erythema. A culture was obtained after debriding the wound and expressing purulent bloody drainage. Doxycycline was prescribed and sent to [facility] in [facility]. He was advised to keep the area between his toes dry and to use Betadine gauze once daily on the plantar left foot, or twice daily if there is significant drainage. He should elevate his legs to reduce swelling and avoid putting weight on the affected foot. If there is no improvement within 24 hours, or if he develops a fever or feels unwell, he should seek immediate medical attention at the ER.    2. Right foot wound.  The right foot has a large shallow ulceration with thin slough and surrounding mild maceration. Hydrofiber with silver (Aquacel Ag) will be  used on the right foot wound. He was advised to keep the area clean and dry, and to elevate his legs to reduce swelling.    Follow-up  The patient will follow up in 2 weeks.    PROCEDURE  The wound on the left foot was debrided, revealing deep tunneling with severe tenderness. The cavity was irrigated copiously with sterile normal saline.      Patient was given instructions and counseling regarding their condition or for health maintenance advice, as well as the wound care plan and recommendations. They understand and agree with the plan.  They will follow back up here in the clinic but are instructed to contact us in the interim should they have any new, returning, or worsening symptoms or concerns. Please see specific information pulled into the AVS if appropriate.     Dragon Dictation utilized for chart completion.    Follow Up   Return in about 2 weeks (around 4/28/2025).      Uma Phan MD    Patient or patient representative verbalized consent for the use of Ambient Listening during the visit with  Uma Phan MD for chart documentation. 4/14/2025  16:36 EDT

## 2025-04-18 LAB
BACTERIA SPEC AEROBE CULT: ABNORMAL
BACTERIA SPEC AEROBE CULT: ABNORMAL
GRAM STN SPEC: ABNORMAL

## 2025-04-21 ENCOUNTER — HOSPITAL ENCOUNTER (EMERGENCY)
Facility: HOSPITAL | Age: 65
Discharge: HOME | End: 2025-04-21
Attending: EMERGENCY MEDICINE | Admitting: EMERGENCY MEDICINE
Payer: MEDICARE

## 2025-04-21 ENCOUNTER — APPOINTMENT (OUTPATIENT)
Dept: GENERAL RADIOLOGY | Facility: HOSPITAL | Age: 65
End: 2025-04-21
Payer: MEDICARE

## 2025-04-21 VITALS
TEMPERATURE: 98.6 F | RESPIRATION RATE: 18 BRPM | HEIGHT: 70 IN | WEIGHT: 251.32 LBS | OXYGEN SATURATION: 94 % | DIASTOLIC BLOOD PRESSURE: 64 MMHG | HEART RATE: 51 BPM | BODY MASS INDEX: 35.98 KG/M2 | SYSTOLIC BLOOD PRESSURE: 133 MMHG

## 2025-04-21 DIAGNOSIS — S91.302A WOUND OF LEFT FOOT: Primary | ICD-10-CM

## 2025-04-21 LAB
ALBUMIN SERPL-MCNC: 3.4 G/DL (ref 3.5–5.2)
ALBUMIN/GLOB SERPL: 0.8 G/DL
ALP SERPL-CCNC: 110 U/L (ref 39–117)
ALT SERPL W P-5'-P-CCNC: 8 U/L (ref 1–41)
ANION GAP SERPL CALCULATED.3IONS-SCNC: 10 MMOL/L (ref 5–15)
AST SERPL-CCNC: 11 U/L (ref 1–40)
BASOPHILS # BLD AUTO: 0.1 10*3/MM3 (ref 0–0.2)
BASOPHILS NFR BLD AUTO: 0.8 % (ref 0–1.5)
BILIRUB SERPL-MCNC: 0.6 MG/DL (ref 0–1.2)
BUN SERPL-MCNC: 20 MG/DL (ref 8–23)
BUN/CREAT SERPL: 22.5 (ref 7–25)
CALCIUM SPEC-SCNC: 8.6 MG/DL (ref 8.6–10.5)
CHLORIDE SERPL-SCNC: 99 MMOL/L (ref 98–107)
CO2 SERPL-SCNC: 28 MMOL/L (ref 22–29)
CREAT SERPL-MCNC: 0.89 MG/DL (ref 0.76–1.27)
CRP SERPL-MCNC: 5.21 MG/DL (ref 0–0.5)
DEPRECATED RDW RBC AUTO: 49.2 FL (ref 37–54)
EGFRCR SERPLBLD CKD-EPI 2021: 96.9 ML/MIN/1.73
EOSINOPHIL # BLD AUTO: 0.48 10*3/MM3 (ref 0–0.4)
EOSINOPHIL NFR BLD AUTO: 3.7 % (ref 0.3–6.2)
ERYTHROCYTE [DISTWIDTH] IN BLOOD BY AUTOMATED COUNT: 15.5 % (ref 12.3–15.4)
ERYTHROCYTE [SEDIMENTATION RATE] IN BLOOD: 54 MM/HR (ref 0–20)
GLOBULIN UR ELPH-MCNC: 4.5 GM/DL
GLUCOSE SERPL-MCNC: 124 MG/DL (ref 65–99)
HCT VFR BLD AUTO: 44.9 % (ref 37.5–51)
HGB BLD-MCNC: 14.5 G/DL (ref 13–17.7)
IMM GRANULOCYTES # BLD AUTO: 0.04 10*3/MM3 (ref 0–0.05)
IMM GRANULOCYTES NFR BLD AUTO: 0.3 % (ref 0–0.5)
LYMPHOCYTES # BLD AUTO: 2.4 10*3/MM3 (ref 0.7–3.1)
LYMPHOCYTES NFR BLD AUTO: 18.3 % (ref 19.6–45.3)
MCH RBC QN AUTO: 28 PG (ref 26.6–33)
MCHC RBC AUTO-ENTMCNC: 32.3 G/DL (ref 31.5–35.7)
MCV RBC AUTO: 86.8 FL (ref 79–97)
MONOCYTES # BLD AUTO: 1.34 10*3/MM3 (ref 0.1–0.9)
MONOCYTES NFR BLD AUTO: 10.2 % (ref 5–12)
NEUTROPHILS NFR BLD AUTO: 66.7 % (ref 42.7–76)
NEUTROPHILS NFR BLD AUTO: 8.77 10*3/MM3 (ref 1.7–7)
NRBC BLD AUTO-RTO: 0 /100 WBC (ref 0–0.2)
PLATELET # BLD AUTO: 306 10*3/MM3 (ref 140–450)
PMV BLD AUTO: 10.4 FL (ref 6–12)
POTASSIUM SERPL-SCNC: 3.8 MMOL/L (ref 3.5–5.2)
PROT SERPL-MCNC: 7.9 G/DL (ref 6–8.5)
RBC # BLD AUTO: 5.17 10*6/MM3 (ref 4.14–5.8)
SODIUM SERPL-SCNC: 137 MMOL/L (ref 136–145)
WBC NRBC COR # BLD AUTO: 13.13 10*3/MM3 (ref 3.4–10.8)

## 2025-04-21 PROCEDURE — 73630 X-RAY EXAM OF FOOT: CPT

## 2025-04-21 PROCEDURE — 85652 RBC SED RATE AUTOMATED: CPT | Performed by: EMERGENCY MEDICINE

## 2025-04-21 PROCEDURE — 36415 COLL VENOUS BLD VENIPUNCTURE: CPT

## 2025-04-21 PROCEDURE — 80053 COMPREHEN METABOLIC PANEL: CPT | Performed by: EMERGENCY MEDICINE

## 2025-04-21 PROCEDURE — 99283 EMERGENCY DEPT VISIT LOW MDM: CPT

## 2025-04-21 PROCEDURE — 86140 C-REACTIVE PROTEIN: CPT | Performed by: EMERGENCY MEDICINE

## 2025-04-21 PROCEDURE — 85025 COMPLETE CBC W/AUTO DIFF WBC: CPT | Performed by: EMERGENCY MEDICINE

## 2025-04-21 NOTE — ED PROVIDER NOTES
Time: 4:31 AM EDT  Date of encounter:  4/21/2025  Independent Historian/Clinical History and Information was obtained by:   Patient    History is limited by: N/A    Chief Complaint: wound evaluation      History of Present Illness:  Patient is a 64 y.o. year old male who presents to the emergency department for evaluation of Wound to left foot.  Patient is currently followed by wound care as well as podiatry.  Patient states he was on antibiotics a week ago.  He states he feels that the wounds are completely healed and want to get it evaluated.  No fever or chills.  No other complaints.      Patient Care Team  Primary Care Provider: Pardeep Le MD    Past Medical History:     No Known Allergies  Past Medical History:   Diagnosis Date    Arthritis     Foot infection      Past Surgical History:   Procedure Laterality Date    COLONOSCOPY N/A 06/02/2023    Procedure: COLONOSCOPY WITH POLYPECTOMIES;  Surgeon: Pardeep Ramirez MD;  Location: [facility] ENDOSCOPY;  Service: Gastroenterology;  Laterality: N/A;  COLON POLYPS    CORNEAL TRANSPLANT Right     x2  - transplant did not take    EYE SURGERY Right     cornea transplant    HAND SURGERY      ganglion cyst     Family History   Problem Relation Age of Onset    Colon cancer Neg Hx        Home Medications:  Prior to Admission medications    Medication Sig Start Date End Date Taking? Authorizing Provider   doxycycline (VIBRAMYCIN) 100 MG capsule Take 1 capsule by mouth 2 (Two) Times a Day for 7 days. Do not take at the same time as any vitamin or mineral supplements. 4/14/25 4/21/25  Uma Phan MD   Ergocalciferol (Vitamin D2) 50 MCG (2000 UT) tablet Take 50 mcg by mouth 2 (Two) Times a Week.  Patient not taking: Reported on 4/14/2025    ProviderKala MD   naproxen sodium (ALEVE) 220 MG tablet Take 1 tablet by mouth 2 (Two) Times a Day As Needed for Mild Pain.    ProviderKala MD        Social History:   Social History     Tobacco Use    Smoking  "status: Every Day     Current packs/day: 1.00     Average packs/day: 1 pack/day for 48.3 years (48.3 ttl pk-yrs)     Types: Cigarettes     Start date: 1/1/1977    Smokeless tobacco: Never   Vaping Use    Vaping status: Never Used   Substance Use Topics    Alcohol use: Yes     Alcohol/week: 1.0 standard drink of alcohol     Types: 1 Glasses of wine per week     Comment: mayabe every 3 months    Drug use: Not Currently     Comment: when he was younger         Review of Systems:  Review of Systems   Constitutional:  Negative for chills and fever.   HENT:  Negative for congestion, ear pain and sore throat.    Eyes:  Negative for pain.   Respiratory:  Negative for cough, chest tightness and shortness of breath.    Cardiovascular:  Negative for chest pain.   Gastrointestinal:  Negative for abdominal pain, diarrhea, nausea and vomiting.   Genitourinary:  Negative for flank pain and hematuria.   Musculoskeletal:  Negative for joint swelling.   Skin:  Positive for wound. Negative for pallor.   Neurological:  Negative for seizures and headaches.   All other systems reviewed and are negative.       Physical Exam:  /90 (BP Location: Left arm, Patient Position: Sitting)   Pulse 51   Temp 98.6 °F (37 °C) (Oral)   Resp 20   Ht 177.8 cm (70\")   Wt 114 kg (251 lb 5.2 oz)   SpO2 98%   BMI 36.06 kg/m²     Physical Exam  Constitutional:       Appearance: Normal appearance.   HENT:      Head: Normocephalic and atraumatic.      Nose: Nose normal.      Mouth/Throat:      Mouth: Mucous membranes are moist.   Eyes:      Extraocular Movements: Extraocular movements intact.      Conjunctiva/sclera: Conjunctivae normal.      Pupils: Pupils are equal, round, and reactive to light.   Cardiovascular:      Rate and Rhythm: Normal rate and regular rhythm.      Pulses: Normal pulses.      Heart sounds: Normal heart sounds.   Pulmonary:      Effort: Pulmonary effort is normal.      Breath sounds: Normal breath sounds.   Abdominal:      " General: There is no distension.      Palpations: Abdomen is soft.      Tenderness: There is no abdominal tenderness.   Musculoskeletal:         General: Normal range of motion.      Cervical back: Normal range of motion.   Skin:     General: Skin is warm and dry.      Capillary Refill: Capillary refill takes less than 2 seconds.      Comments: Patient has a wound to the plantar surface of forefoot.  Wound is approximately 1 x 0.5 cm.  Wound does not probe to bone.  No significant surrounding erythema.  No drainage.   Neurological:      General: No focal deficit present.      Mental Status: He is alert and oriented to person, place, and time. Mental status is at baseline.   Psychiatric:         Mood and Affect: Mood normal.         Behavior: Behavior normal.                    Medical Decision Making:      Comorbidities that affect care:    arthritis, Smoking    External Notes reviewed:    Previous Clinic Note: Patient was seen by wound care on/14/25 for wound infection, abscess of left foot, open wound to left foot, open wound to right foot.      The following orders were placed and all results were independently analyzed by me:  Orders Placed This Encounter   Procedures    XR Foot 3+ View Left    Comprehensive Metabolic Panel    Sedimentation Rate    C-reactive Protein    CBC Auto Differential    CBC & Differential       Medications Given in the Emergency Department:  Medications - No data to display     ED Course:         Labs:    Lab Results (last 24 hours)       Procedure Component Value Units Date/Time    CBC & Differential [[id number]]  (Abnormal) Collected: 04/21/25 0419    Specimen: Blood from Arm, Left Updated: 04/21/25 0424    Narrative:      The following orders were created for panel order CBC & Differential.  Procedure                               Abnormality         Status                     ---------                               -----------         ------                     CBC Auto  Differential[[id number]]        Abnormal            Final result                 Please view results for these tests on the individual orders.    Comprehensive Metabolic Panel [[id number]]  (Abnormal) Collected: 04/21/25 0419    Specimen: Blood from Arm, Left Updated: 04/21/25 0439     Glucose 124 mg/dL      BUN 20 mg/dL      Creatinine 0.89 mg/dL      Sodium 137 mmol/L      Potassium 3.8 mmol/L      Chloride 99 mmol/L      CO2 28.0 mmol/L      Calcium 8.6 mg/dL      Total Protein 7.9 g/dL      Albumin 3.4 g/dL      ALT (SGPT) 8 U/L      AST (SGOT) 11 U/L      Alkaline Phosphatase 110 U/L      Total Bilirubin 0.6 mg/dL      Globulin 4.5 gm/dL      A/G Ratio 0.8 g/dL      BUN/Creatinine Ratio 22.5     Anion Gap 10.0 mmol/L      eGFR 96.9 mL/min/1.73     Narrative:      GFR Categories in Chronic Kidney Disease (CKD)      GFR Category          GFR (mL/min/1.73)    Interpretation  G1                     90 or greater         Normal or high (1)  G2                      60-89                Mild decrease (1)  G3a                   45-59                Mild to moderate decrease  G3b                   30-44                Moderate to severe decrease  G4                    15-29                Severe decrease  G5                    14 or less           Kidney failure          (1)In the absence of evidence of kidney disease, neither GFR category G1 or G2 fulfill the criteria for CKD.    eGFR calculation 2021 CKD-EPI creatinine equation, which does not include race as a factor    Sedimentation Rate [[id number]]  (Abnormal) Collected: 04/21/25 0419    Specimen: Blood from Arm, Left Updated: 04/21/25 0435     Sed Rate 54 mm/hr     C-reactive Protein [[id number]]  (Abnormal) Collected: 04/21/25 0419    Specimen: Blood from Arm, Left Updated: 04/21/25 0439     C-Reactive Protein 5.21 mg/dL     CBC Auto Differential [[id number]]  (Abnormal) Collected: 04/21/25 0419    Specimen: Blood from Arm, Left Updated: 04/21/25 0424     WBC 13.13  10*3/mm3      RBC 5.17 10*6/mm3      Hemoglobin 14.5 g/dL      Hematocrit 44.9 %      MCV 86.8 fL      MCH 28.0 pg      MCHC 32.3 g/dL      RDW 15.5 %      RDW-SD 49.2 fl      MPV 10.4 fL      Platelets 306 10*3/mm3      Neutrophil % 66.7 %      Lymphocyte % 18.3 %      Monocyte % 10.2 %      Eosinophil % 3.7 %      Basophil % 0.8 %      Immature Grans % 0.3 %      Neutrophils, Absolute 8.77 10*3/mm3      Lymphocytes, Absolute 2.40 10*3/mm3      Monocytes, Absolute 1.34 10*3/mm3      Eosinophils, Absolute 0.48 10*3/mm3      Basophils, Absolute 0.10 10*3/mm3      Immature Grans, Absolute 0.04 10*3/mm3      nRBC 0.0 /100 WBC              Imaging:    XR Foot 3+ View Left  Result Date: 4/21/2025  XR FOOT 3+ VW LEFT-  Date of exam: 4/21/2025 4:06 AM.  Comparisons: 7/31/2022; 7/30/2022.  INDICATIONS: 64-year-old male with left foot wound/infection; + leukocytosis; imaging follow-up.  FINDINGS: Three (3) nonweightbearing views of the left foot were obtained. No acute fracture or acute malalignment is identified. Again, advanced inflammatory arthritic changes are seen with chronic malalignment. Psoriatic arthritis would be in the differential diagnosis for the findings. There is severe hallux valgus deformity (seventy degrees). There is near complete valgus subluxation at the left first (1st) metatarsal-phalangeal (MTP) joint. Multiple hammertoe deformities are seen. No convincing radiographic evidence for osteomyelitis. Please correlate clinically. No retained radiopaque foreign body is identified. No definite subcutaneous emphysema. If symptoms or clinical concerns persist, consider imaging follow-up. For instance, consider left foot MRI examination for further imaging assessment if clinically warranted and if not contraindicated.       No acute fracture or acute malalignment is identified. Please see above comments for further detail.   Portions of this note were completed with a voice recognition program.  4/21/2025  4:42 AM by Dylon Velazco MD on Workstation: [email or web address]          Differential Diagnosis and Discussion:    Wound Evaluation: Differential diagnosis includes but is not limited to laceration, abrasion, puncture, burn, ulcer, cellulitis, abscess, vasculitis, malignancy, and rash.    PROCEDURES:    Labs were collected in the emergency department and all labs were reviewed and interpreted by me.  X-ray were performed in the emergency department and all X-ray impressions were independently interpreted by me.    No orders to display       Procedures    MDM  Number of Diagnoses or Management Options  Diagnosis management comments: Patient is afebrile nontoxic-appearing.  Vital signs are stable.  At time of evaluation is lying in bed in no acute distress.  Labs retensioned mildly elevated white count of 13.  Sed rate and CRP is also slightly elevated.  X-ray did not show acute finding.  Wound does not appear infected on exam.  No surrounding erythema.  No drainage.  Wound does not probe to bone.  Patient is already being followed by wound care as well as podiatry.  Recommend that he follows up closely with both his providers.  Discussed return precautions, discharge instructions and answered all his questions.       Amount and/or Complexity of Data Reviewed  Clinical lab tests: reviewed  Tests in the radiology section of CPT®: reviewed  Review and summarize past medical records: yes  Independent visualization of images, tracings, or specimens: yes    Risk of Complications, Morbidity, and/or Mortality  Presenting problems: moderate  Management options: moderate                       Patient Care Considerations:    SEPSIS was considered but is NOT present in the emergency department as SIRS criteria is not present.      Consultants/Shared Management Plan:    None    Social Determinants of Health:    Patient is independent, reliable, and has access to care.       Disposition and Care Coordination:    Discharged: The patient is  suitable and stable for discharge with no need for consideration of admission.    I have explained the patient´s condition, diagnoses and treatment plan based on the information available to me at this time. I have answered questions and addressed any concerns. The patient has a good  understanding of the patient´s diagnosis, condition, and treatment plan as can be expected at this point. The vital signs have been stable. The patient´s condition is stable and appropriate for discharge from the emergency department.      The patient will pursue further outpatient evaluation with the primary care physician or other designated or consulting physician as outlined in the discharge instructions. They are agreeable to this plan of care and follow-up instructions have been explained in detail. The patient has received these instructions in written format and has expressed an understanding of the discharge instructions. The patient is aware that any significant change in condition or worsening of symptoms should prompt an immediate return to this or the closest emergency department or call to 911.  I have explained discharge medications and the need for follow up with the patient/caretakers. This was also printed in the discharge instructions. Patient was discharged with the following medications and follow up:      Medication List      No changes were made to your prescriptions during this visit.      Pardeep Le MD  [address] [id number]  [phone]    In 2 days      Uma Phan MD  [address] [facility]  [address]  [place] [id number]  [phone]    Schedule an appointment as soon as possible for a visit       Ming Bloom, SHELLI  [address] [id number] [phone]    Schedule an appointment as soon as possible for a visit          Final diagnoses:   Wound of left foot        ED Disposition       ED Disposition   Discharge    Condition   Stable    Comment   --                This medical record created using voice recognition software.             Penny Perez MD  04/21/25 0533

## 2025-05-06 ENCOUNTER — OFFICE VISIT (OUTPATIENT)
Dept: WOUND CARE | Facility: HOSPITAL | Age: 65
End: 2025-05-06
Payer: MEDICARE

## 2025-05-06 VITALS
RESPIRATION RATE: 18 BRPM | TEMPERATURE: 97.9 F | HEART RATE: 102 BPM | DIASTOLIC BLOOD PRESSURE: 78 MMHG | SYSTOLIC BLOOD PRESSURE: 147 MMHG

## 2025-05-06 DIAGNOSIS — I83.013 VENOUS STASIS ULCER OF RIGHT ANKLE LIMITED TO BREAKDOWN OF SKIN WITH VARICOSE VEINS: ICD-10-CM

## 2025-05-06 DIAGNOSIS — S91.302A OPEN WOUND OF LEFT FOOT, INITIAL ENCOUNTER: Primary | ICD-10-CM

## 2025-05-06 DIAGNOSIS — L08.9 WOUND INFECTION: ICD-10-CM

## 2025-05-06 DIAGNOSIS — T14.8XXA WOUND INFECTION: ICD-10-CM

## 2025-05-06 DIAGNOSIS — A49.02 INFECTION OF WOUND DUE TO METHICILLIN RESISTANT STAPHYLOCOCCUS AUREUS (MRSA): ICD-10-CM

## 2025-05-06 DIAGNOSIS — L03.115 CELLULITIS OF RIGHT LOWER EXTREMITY WITHOUT FOOT: ICD-10-CM

## 2025-05-06 DIAGNOSIS — R60.0 EDEMA OF RIGHT LOWER EXTREMITY: ICD-10-CM

## 2025-05-06 DIAGNOSIS — L97.311 VENOUS STASIS ULCER OF RIGHT ANKLE LIMITED TO BREAKDOWN OF SKIN WITH VARICOSE VEINS: ICD-10-CM

## 2025-05-06 PROCEDURE — 1159F MED LIST DOCD IN RCRD: CPT | Performed by: EMERGENCY MEDICINE

## 2025-05-06 PROCEDURE — 1160F RVW MEDS BY RX/DR IN RCRD: CPT | Performed by: EMERGENCY MEDICINE

## 2025-05-06 RX ORDER — LINEZOLID 600 MG/1
600 TABLET, FILM COATED ORAL 2 TIMES DAILY
Qty: 14 TABLET | Refills: 0 | Status: SHIPPED | OUTPATIENT
Start: 2025-05-06 | End: 2025-05-13

## 2025-05-06 NOTE — PROGRESS NOTES
Chief Complaint  Wound Check (Follow-up on Right ankle and Left foot wounds)    Subjective      History of Present Illness    Dank Johnson  is a 64 y.o. male     History of Present Illness  The patient is a 64-year-old male here for reevaluation of left foot wound and right ankle wound. After his last visit on 04/14/2025, the patient had a wound culture that was positive for MRSA and Corynebacterium. He received a course of doxycycline and says that everything improved but then got worse again after the antibiotics stopped.    He reports an exacerbation of redness in his leg yesterday, which he associates with increased pain. He notes that the condition worsened approximately one week after the completion of his antibiotic course. He also mentions a small, intermittent spot on the top of his right great toe. He has been managing the wound by wrapping it and applying Betadine to the left foot.    The pain in his right ankle wound has improved, rating it as a 7 on a scale of 1 to 10. He continues to experience drainage from the right ankle but reports no drainage from the left foot. He has been using Aquacel silver felt patch on the right ankle. He has been changing the bandages daily with the assistance of friends.    MEDICATIONS  Doxycycline (completed).    Allergies:  Patient has no known allergies.      Current Outpatient Medications:     Ergocalciferol (Vitamin D2) 50 MCG (2000 UT) tablet, Take 50 mcg by mouth 2 (Two) Times a Week. (Patient not taking: Reported on 4/14/2025), Disp: , Rfl:     linezolid (ZYVOX) 600 MG tablet, Take 1 tablet by mouth 2 (Two) Times a Day for 7 days., Disp: 14 tablet, Rfl: 0    naproxen sodium (ALEVE) 220 MG tablet, Take 1 tablet by mouth 2 (Two) Times a Day As Needed for Mild Pain., Disp: , Rfl:     Past Medical History:   Diagnosis Date    Arthritis     Foot infection      Past Surgical History:   Procedure Laterality Date    COLONOSCOPY N/A 06/02/2023    Procedure: COLONOSCOPY  WITH POLYPECTOMIES;  Surgeon: Pardeep Ramirez MD;  Location: [facility] ENDOSCOPY;  Service: Gastroenterology;  Laterality: N/A;  COLON POLYPS    CORNEAL TRANSPLANT Right     x2  - transplant did not take    EYE SURGERY Right     cornea transplant    HAND SURGERY      ganglion cyst     Social History     Socioeconomic History    Marital status:    Tobacco Use    Smoking status: Every Day     Current packs/day: 1.00     Average packs/day: 1 pack/day for 48.3 years (48.3 ttl pk-yrs)     Types: Cigarettes     Start date: 1/1/1977    Smokeless tobacco: Never   Vaping Use    Vaping status: Never Used   Substance and Sexual Activity    Alcohol use: Yes     Alcohol/week: 1.0 standard drink of alcohol     Types: 1 Glasses of wine per week     Comment: mayabe every 3 months    Drug use: Not Currently     Comment: when he was younger    Sexual activity: Yes     Partners: Female     Birth control/protection: Condom           Objective     Vitals:    05/06/25 1032   BP: 147/78   BP Location: Right arm   Patient Position: Sitting   Cuff Size: Adult   Pulse: 102   Resp: 18  Comment: O2: 98% on RA   Temp: 97.9 °F (36.6 °C)   TempSrc: Temporal   PainSc: 7    PainLoc: Foot  Comment: Right Foot Pain     There is no height or weight on file to calculate BMI.    STEADI Fall Risk Assessment has not been completed.     Review of Systems     ROS:  Per HPI.     I have reviewed the HPI and ROS as documented by MA/RN. Uma Phan MD    Physical Exam     NAD  AAOx3, pleasant, cooperative      Physical Exam  The patient's left plantar foot has significantly improved with only two small open wounds remaining on the plantar aspect. The right lateral ankle wound has decreased in slough and size. There is 2-3+ pitting edema of the right lower extremity with heat, erythema, and induration.                   Result Review :  The following data was reviewed by: Uma Phan MD on 05/06/2025:    Prior notes and images.      Wound  debridement    Performed by: Uma Phan MD  Authorized by: Uma Phan MD  Associated wounds:   Wound 04/14/25 1544 Left plantar    Correct patient: Identification verified by two methods:     Verbally and Date of birth  Correct procedure/consent    Correct site/side    Correct equipment as applicable    How many wounds are you performing a debridement on?:  1  Wound 1:     Nursing Documentation:   Measurements:     The total amount debrided on this wound was under 20 cm2.     Provider Documentation    Debridement type:  Sharp/excisional    Betadine       None    Tissue debrided:  Small    Level removed:  Subcutaneous    Patient tolerance:  Good    Hemostasis achieved by:  Silver nitrate and Direct pressure    Wound Bed Post Debridement: See Photo            Assessment and Plan   Diagnoses and all orders for this visit:    1. Open wound of left foot, initial encounter (Primary)  -     Wound debridement    2. Venous stasis ulcer of right ankle limited to breakdown of skin with varicose veins    3. Edema of right lower extremity    4. Cellulitis of right lower extremity without foot  -     linezolid (ZYVOX) 600 MG tablet; Take 1 tablet by mouth 2 (Two) Times a Day for 7 days.  Dispense: 14 tablet; Refill: 0    5. Wound infection  -     linezolid (ZYVOX) 600 MG tablet; Take 1 tablet by mouth 2 (Two) Times a Day for 7 days.  Dispense: 14 tablet; Refill: 0    6. Infection of wound due to methicillin resistant Staphylococcus aureus (MRSA)  -     linezolid (ZYVOX) 600 MG tablet; Take 1 tablet by mouth 2 (Two) Times a Day for 7 days.  Dispense: 14 tablet; Refill: 0        Assessment & Plan  1. Left foot wound.  The left foot wound is showing significant improvement with minimal drainage. He is advised to continue using Betadine on the left foot and to keep the wound bandaged daily. He should also apply Betadine to a small spot on the top of his left great toe that comes and goes.    2. Right ankle wound.  The  right ankle wound has decreased in slough and size but still exhibits drainage. There is 2-3+ pitting edema with heat, erythema, and induration, suggesting cellulitis. He is advised to continue using Aquacel silver on the right ankle and to ensure proper wrapping to control swelling. He should keep his legs elevated as much as possible to reduce edema and prevent skin breaks and reinfection.  Case discussed with MD stewardship infectious disease specialist.  Recommends infusion as dalbavancin or oral linezolid.  Linezolid sent to pharmacy.    Follow-up  The patient will follow up in 2 weeks.      Patient was given instructions and counseling regarding their condition or for health maintenance advice, as well as the wound care plan and recommendations. They understand and agree with the plan.  They will follow back up here in the clinic but are instructed to contact us in the interim should they have any new, returning, or worsening symptoms or concerns. Please see specific information pulled into the AVS if appropriate.     Dragon Dictation utilized for chart completion.    Follow Up   Return in about 2 weeks (around 5/20/2025).      Uma Phan MD    Patient or patient representative verbalized consent for the use of Ambient Listening during the visit with  Uma Phan MD for chart documentation. 5/6/2025  11:29 EDT

## 2025-05-20 ENCOUNTER — OFFICE VISIT (OUTPATIENT)
Dept: WOUND CARE | Facility: HOSPITAL | Age: 65
End: 2025-05-20
Payer: MEDICARE

## 2025-05-20 VITALS
TEMPERATURE: 98 F | RESPIRATION RATE: 18 BRPM | DIASTOLIC BLOOD PRESSURE: 92 MMHG | HEART RATE: 89 BPM | SYSTOLIC BLOOD PRESSURE: 146 MMHG

## 2025-05-20 DIAGNOSIS — S91.302A OPEN WOUND OF LEFT FOOT, INITIAL ENCOUNTER: ICD-10-CM

## 2025-05-20 DIAGNOSIS — I83.013 VENOUS STASIS ULCER OF RIGHT ANKLE LIMITED TO BREAKDOWN OF SKIN WITH VARICOSE VEINS: ICD-10-CM

## 2025-05-20 DIAGNOSIS — L02.612 ABSCESS OF LEFT FOOT: Primary | ICD-10-CM

## 2025-05-20 DIAGNOSIS — A49.02 INFECTION OF WOUND DUE TO METHICILLIN RESISTANT STAPHYLOCOCCUS AUREUS (MRSA): ICD-10-CM

## 2025-05-20 DIAGNOSIS — L97.311 VENOUS STASIS ULCER OF RIGHT ANKLE LIMITED TO BREAKDOWN OF SKIN WITH VARICOSE VEINS: ICD-10-CM

## 2025-05-20 DIAGNOSIS — M06.9 RHEUMATOID ARTHRITIS INVOLVING MULTIPLE SITES, UNSPECIFIED WHETHER RHEUMATOID FACTOR PRESENT: ICD-10-CM

## 2025-05-20 PROCEDURE — 1159F MED LIST DOCD IN RCRD: CPT | Performed by: EMERGENCY MEDICINE

## 2025-05-20 PROCEDURE — 1160F RVW MEDS BY RX/DR IN RCRD: CPT | Performed by: EMERGENCY MEDICINE

## 2025-05-20 RX ORDER — SULFAMETHOXAZOLE AND TRIMETHOPRIM 800; 160 MG/1; MG/1
2 TABLET ORAL 2 TIMES DAILY
Qty: 28 TABLET | Refills: 0 | Status: SHIPPED | OUTPATIENT
Start: 2025-05-20 | End: 2025-05-27

## 2025-05-20 NOTE — PROGRESS NOTES
Chief Complaint  Wound Check (Follow-up on Right Ankle and Left foot wounds )    Subjective      History of Present Illness    Dank Johnson  is a 64 y.o. male     History of Present Illness  The patient is a 64-year-old gentleman here for a recheck of left foot and right lower extremity wounds.    He has been utilizing Aquacel silver on the right side, which appears to be showing some improvement. However, he continues to experience significant discomfort. The leg exhibits reduced swelling but remains red. He has been diligent in changing the dressing daily.    For the left foot, he has been applying Betadine. He reports significant soreness in the left foot, particularly when bearing weight on it. He has an appointment scheduled with Dr. Bloom tomorrow. He has been consistent in changing the dressing daily.      ALLERGIES  The patient has no known allergies.    MEDICATIONS  Current: Aleve    Allergies:  Patient has no known allergies.      Current Outpatient Medications:     Ergocalciferol (Vitamin D2) 50 MCG (2000 UT) tablet, Take 50 mcg by mouth 2 (Two) Times a Week. (Patient not taking: Reported on 4/14/2025), Disp: , Rfl:     naproxen sodium (ALEVE) 220 MG tablet, Take 1 tablet by mouth 2 (Two) Times a Day As Needed for Mild Pain., Disp: , Rfl:     sulfamethoxazole-trimethoprim (BACTRIM DS,SEPTRA DS) 800-160 MG per tablet, Take 2 tablets by mouth 2 (Two) Times a Day for 7 days., Disp: 28 tablet, Rfl: 0    Past Medical History:   Diagnosis Date    Arthritis     Foot infection      Past Surgical History:   Procedure Laterality Date    COLONOSCOPY N/A 06/02/2023    Procedure: COLONOSCOPY WITH POLYPECTOMIES;  Surgeon: Pardeep Ramirez MD;  Location: [facility] ENDOSCOPY;  Service: Gastroenterology;  Laterality: N/A;  COLON POLYPS    CORNEAL TRANSPLANT Right     x2  - transplant did not take    EYE SURGERY Right     cornea transplant    HAND SURGERY      ganglion cyst     Social History     Socioeconomic History     Marital status:    Tobacco Use    Smoking status: Every Day     Current packs/day: 1.00     Average packs/day: 1 pack/day for 48.4 years (48.4 ttl pk-yrs)     Types: Cigarettes     Start date: 1/1/1977    Smokeless tobacco: Never   Vaping Use    Vaping status: Never Used   Substance and Sexual Activity    Alcohol use: Yes     Alcohol/week: 1.0 standard drink of alcohol     Types: 1 Glasses of wine per week     Comment: mayabe every 3 months    Drug use: Not Currently     Comment: when he was younger    Sexual activity: Yes     Partners: Female     Birth control/protection: Condom           Objective     Vitals:    05/20/25 0859   BP: 146/92   BP Location: Right arm   Patient Position: Sitting   Cuff Size: Adult   Pulse: 89   Resp: 18  Comment: O2: 93% on RA   Temp: 98 °F (36.7 °C)   TempSrc: Temporal   PainSc: 9    PainLoc: Foot  Comment: Bilateral foot pain     There is no height or weight on file to calculate BMI.    STEADI Fall Risk Assessment has not been completed.     Review of Systems     ROS:  Per HPI.     I have reviewed the HPI and ROS as documented by MA/RN. Uma Phan MD    Physical Exam     NAD  AAOx3, pleasant, cooperative      Physical Exam  The wound on the patient's right lateral ankle is improving. Thin slough and biofilm are present.  Lidocaine gel was applied and left in place for 20 minutes which allowed for further softening of the biofilm and improved compliance with more aggressive cleaning to remove it.  There is tenderness throughout the base. Stasis rubor is diffusely more pronounced on the right than the left lower extremities. Edema has significantly improved. The left plantar foot has a callus and an underlying wound with purulent drainage expressed. Significant tenderness is present.                   Result Review :  The following data was reviewed by: Uma Phan MD on 05/20/2025:    Prior notes and images.      Wound debridement    Performed by: Uma Phan,  MD  Authorized by: Uma Phan MD  Associated wounds:   Wound 04/14/25 1544 Left plantar    Correct patient: Identification verified by two methods:     Verbally  Correct procedure/consent    Correct site/side    Correct equipment as applicable    How many wounds are you performing a debridement on?:  1  Wound 1:     Nursing Documentation:   Measurements:     The total amount debrided on this wound was under 20 cm2.     Provider Documentation    Debridement type:  Sharp/excisional    Betadine       None    Tissue debrided:  Moderate    Level removed:  Subcutaneous    Patient tolerance:  Good    Hemostasis achieved by:  Direct pressure and Silver nitrate    Wound Bed Post Debridement: See Photo            Assessment and Plan   Diagnoses and all orders for this visit:    1. Abscess of left foot (Primary)  -     sulfamethoxazole-trimethoprim (BACTRIM DS,SEPTRA DS) 800-160 MG per tablet; Take 2 tablets by mouth 2 (Two) Times a Day for 7 days.  Dispense: 28 tablet; Refill: 0    2. Open wound of left foot, initial encounter  -     Wound debridement    3. Venous stasis ulcer of right ankle limited to breakdown of skin with varicose veins    4. Infection of wound due to methicillin resistant Staphylococcus aureus (MRSA)  -     sulfamethoxazole-trimethoprim (BACTRIM DS,SEPTRA DS) 800-160 MG per tablet; Take 2 tablets by mouth 2 (Two) Times a Day for 7 days.  Dispense: 28 tablet; Refill: 0    5. Rheumatoid arthritis involving multiple sites, unspecified whether rheumatoid factor present        Assessment & Plan  1. Wound on the right lateral ankle.  The wound on the right lateral ankle is showing signs of improvement with reduced drainage and less swelling. Aquacel silver will be continued as it seems to be effective. Lidocaine gel was applied to the area to facilitate further cleaning.     2. Wound on the left plantar foot with abscess.  The left plantar foot wound has purulent drainage and significant tenderness,  likely due to a pocket of infection under a callus. The wound was cleaned and irrigated thoroughly. He is advised to stay off the foot to allow it to heal.  We will have him start applying Aquacel Ag to this area daily as well.  A prescription for Bactrim DS will be sent to [facility] to treat the MRSA that grew on prior cultures.    Follow-up  The patient will follow up in 4 weeks.    PROCEDURE  The wound on the left foot was cleaned, and purulent drainage was expressed.      Patient was given instructions and counseling regarding their condition or for health maintenance advice, as well as the wound care plan and recommendations. They understand and agree with the plan.  They will follow back up here in the clinic but are instructed to contact us in the interim should they have any new, returning, or worsening symptoms or concerns. Please see specific information pulled into the AVS if appropriate.     Dragon Dictation utilized for chart completion.    Follow Up   Return in about 4 weeks (around 6/17/2025).      Uma Phna MD    Patient or patient representative verbalized consent for the use of Ambient Listening during the visit with  Uma Phan MD for chart documentation. 5/20/2025  13:29 EDT

## 2025-06-17 ENCOUNTER — OFFICE VISIT (OUTPATIENT)
Dept: WOUND CARE | Facility: HOSPITAL | Age: 65
End: 2025-06-17
Payer: MEDICARE

## 2025-06-17 VITALS
HEART RATE: 96 BPM | TEMPERATURE: 98.4 F | SYSTOLIC BLOOD PRESSURE: 154 MMHG | RESPIRATION RATE: 18 BRPM | DIASTOLIC BLOOD PRESSURE: 91 MMHG

## 2025-06-17 DIAGNOSIS — M06.9 RHEUMATOID ARTHRITIS INVOLVING MULTIPLE SITES, UNSPECIFIED WHETHER RHEUMATOID FACTOR PRESENT: ICD-10-CM

## 2025-06-17 DIAGNOSIS — L02.612 ABSCESS OF LEFT FOOT: ICD-10-CM

## 2025-06-17 DIAGNOSIS — I83.013 VENOUS STASIS ULCER OF RIGHT ANKLE LIMITED TO BREAKDOWN OF SKIN WITH VARICOSE VEINS: ICD-10-CM

## 2025-06-17 DIAGNOSIS — L97.311 VENOUS STASIS ULCER OF RIGHT ANKLE LIMITED TO BREAKDOWN OF SKIN WITH VARICOSE VEINS: ICD-10-CM

## 2025-06-17 DIAGNOSIS — L97.811 VENOUS STASIS ULCER OF OTHER PART OF RIGHT LOWER LEG LIMITED TO BREAKDOWN OF SKIN WITH VARICOSE VEINS: ICD-10-CM

## 2025-06-17 DIAGNOSIS — S91.102A OPEN WOUND OF LEFT GREAT TOE, INITIAL ENCOUNTER: ICD-10-CM

## 2025-06-17 DIAGNOSIS — S91.302D OPEN WOUND OF LEFT FOOT, SUBSEQUENT ENCOUNTER: Primary | ICD-10-CM

## 2025-06-17 DIAGNOSIS — I83.018 VENOUS STASIS ULCER OF OTHER PART OF RIGHT LOWER LEG LIMITED TO BREAKDOWN OF SKIN WITH VARICOSE VEINS: ICD-10-CM

## 2025-06-17 PROCEDURE — 1159F MED LIST DOCD IN RCRD: CPT | Performed by: EMERGENCY MEDICINE

## 2025-06-17 PROCEDURE — 99213 OFFICE O/P EST LOW 20 MIN: CPT | Performed by: EMERGENCY MEDICINE

## 2025-06-17 PROCEDURE — 97597 DBRDMT OPN WND 1ST 20 CM/<: CPT | Performed by: EMERGENCY MEDICINE

## 2025-06-17 PROCEDURE — 1160F RVW MEDS BY RX/DR IN RCRD: CPT | Performed by: EMERGENCY MEDICINE

## 2025-06-17 NOTE — PROGRESS NOTES
Chief Complaint  Wound Check (Wound f/u of right and left feet/)    Subjective      History of Present Illness    Dank Johnson  is a 64 y.o. male     History of Present Illness  The patient is a 64-year-old male here for reevaluation of a wound on his left foot. He has been applying Aquacel Ag to the area.    He has been diligently applying Aquacel Ag to the wound on his left plantar foot, changing the dressing daily. He reports no drainage from the wound. He hit his left great toe on a lawnmower 3 or 4 days ago, which caused significant bleeding. He has been using triple antibiotic ointment for this injury feels like it is improving. He is not currently on any anticoagulant therapy.    Additionally, he mentions a persistent hard callus on the plantar surface of his left foot, which appears to be improving.    He also reports a scrape on his right ankle, which he initially suspected to be a recurrence of cellulitis. However, he now believes it was caused by a minor injury from scraping against a board and says that is improving as well. He frequently experiences minor injuries due to his active lifestyle, including caring for a large yard. He has been applying Aquaphor to these areas. He notes that the redness in his leg is always there but has improved.    MEDICATIONS  Current: Aquacel Ag, triple antibiotic ointment, Aquaphor    Allergies:  Patient has no known allergies.      Current Outpatient Medications:     Ergocalciferol (Vitamin D2) 50 MCG (2000 UT) tablet, Take 50 mcg by mouth 2 (Two) Times a Week. (Patient not taking: Reported on 4/14/2025), Disp: , Rfl:     naproxen sodium (ALEVE) 220 MG tablet, Take 1 tablet by mouth 2 (Two) Times a Day As Needed for Mild Pain., Disp: , Rfl:     Past Medical History:   Diagnosis Date    Arthritis     Foot infection      Past Surgical History:   Procedure Laterality Date    COLONOSCOPY N/A 06/02/2023    Procedure: COLONOSCOPY WITH POLYPECTOMIES;  Surgeon: Pardeep Ramirez  MD Delfin;  Location: [facility] ENDOSCOPY;  Service: Gastroenterology;  Laterality: N/A;  COLON POLYPS    CORNEAL TRANSPLANT Right     x2  - transplant did not take    EYE SURGERY Right     cornea transplant    HAND SURGERY      ganglion cyst     Social History     Socioeconomic History    Marital status:    Tobacco Use    Smoking status: Every Day     Current packs/day: 1.00     Average packs/day: 1 pack/day for 48.5 years (48.5 ttl pk-yrs)     Types: Cigarettes     Start date: 1/1/1977    Smokeless tobacco: Never   Vaping Use    Vaping status: Never Used   Substance and Sexual Activity    Alcohol use: Yes     Alcohol/week: 1.0 standard drink of alcohol     Types: 1 Glasses of wine per week     Comment: mayabe every 3 months    Drug use: Not Currently     Comment: when he was younger    Sexual activity: Yes     Partners: Female     Birth control/protection: Condom           Objective     Vitals:    06/17/25 0858   BP: 154/91   BP Location: Right arm   Pulse: 96   Resp: 18  Comment: 91% on room air   Temp: 98.4 °F (36.9 °C)   PainSc: 5    PainLoc: Foot     There is no height or weight on file to calculate BMI.    STEADI Fall Risk Assessment has not been completed.     Review of Systems     ROS:  Per HPI.     I have reviewed the HPI and ROS as documented by MA/RN. Uma Phan MD    Physical Exam     NAD  AAOx3, pleasant, cooperative    Physical Exam  There is a thick hemosiderin stained callus on the plantar left foot with no significant underlying wound. A small new healing wound is present beneath a blood blister on the distal left great toe. Some scattered excoriations are noted on the right lower extremity and lateral right ankle. Dead skin was able to be removed from the right lateral ankle revealing nearly healed wounds underneath. No evidence of acute infection of any of the wounds.                       Result Review :  The following data was reviewed by: Uma Phan MD on 06/17/2025:    Prior  notes and images.      Wound debridement    Performed by: Uma Phan MD  Authorized by: Uma Phan MD  Associated wounds:   Wound 04/14/25 1544 Left plantar    Correct patient: Identification verified by two methods:     Verbally and Date of birth  Correct procedure/consent    Correct site/side    Correct equipment as applicable    How many wounds are you performing a debridement on?:  1  Wound 1:     Nursing Documentation:   Measurements:     The total amount debrided on this wound was under 20 cm2.     Provider Documentation    Debridement type:  Sharp/excisional    Betadine       None    Tissue debrided:  Moderate    Level removed:  Skin    Patient tolerance:  Good    Hemostasis achieved by:  Silver nitrate    Wound Bed Post Debridement: See Photo            Assessment and Plan   Diagnoses and all orders for this visit:    1. Open wound of left foot, subsequent encounter (Primary)  -     Wound debridement    2. Rheumatoid arthritis involving multiple sites, unspecified whether rheumatoid factor present    3. Venous stasis ulcer of other part of right lower leg limited to breakdown of skin with varicose veins    4. Open wound of left great toe, initial encounter    5. Venous stasis ulcer of right ankle limited to breakdown of skin with varicose veins    6. Abscess of left foot        Assessment & Plan  1. Left foot wound.  The wound on the left foot has shown significant improvement, with no signs of infection or drainage. The patient has been using Aquacel Ag, but it is no longer necessary as the wound is nearly healed. He is advised to apply Vaseline, Aquaphor, or a similar thick greasy moisturizer twice daily to keep the skin soft and pliable, which may help prevent further callus formation and subsequent wounds. If the callus becomes large again, causes discomfort, or if a new wound develops on the plantar aspect of the foot, he should contact us immediately for an expedited appointment.    2. Left  great toe injury.  The injury to the left great toe, caused by stepping on a  bundle, is healing well without signs of infection. He has been using triple antibiotic ointment, but it is recommended to discontinue its use to avoid potential sensitivity reactions. Instead, he should apply Vaseline or Aquaphor to keep the area healthy and prevent sensitivity issues.    3. Right leg excoriations.  The right leg has some scattered excoriations and redness, which the patient attributes to minor scrapes and bumps. There is no evidence of acute infection. He has been using Aquaphor on these areas, which appears to be effective. He is advised to continue using Aquaphor to aid in healing and maintain skin health.  Continue wrapping his legs for edema management.    Follow-up  The patient will follow up with wound care as needed.  He sees a podiatrist regularly and now that the wounds are nearly healed would prefer to decrease visits to different doctors.  He will contact us if he needs wound care assistance again in the future.      Patient was given instructions and counseling regarding their condition or for health maintenance advice, as well as the wound care plan and recommendations. They understand and agree with the plan.  They will follow back up here in the clinic but are instructed to contact us in the interim should they have any new, returning, or worsening symptoms or concerns. Please see specific information pulled into the AVS if appropriate.     Dragon Dictation utilized for chart completion.    Follow Up   Return if symptoms worsen or fail to improve.      Uma Phan MD    Patient or patient representative verbalized consent for the use of Ambient Listening during the visit with  Uma Phan MD for chart documentation. 6/17/2025  09:54 EDT

## 2025-08-12 ENCOUNTER — OFFICE VISIT (OUTPATIENT)
Dept: WOUND CARE | Facility: HOSPITAL | Age: 65
End: 2025-08-12
Payer: MEDICARE

## 2025-08-12 VITALS
HEART RATE: 64 BPM | SYSTOLIC BLOOD PRESSURE: 136 MMHG | RESPIRATION RATE: 18 BRPM | TEMPERATURE: 98.4 F | DIASTOLIC BLOOD PRESSURE: 71 MMHG

## 2025-08-12 DIAGNOSIS — I83.018 VENOUS STASIS ULCER OF OTHER PART OF RIGHT LOWER LEG LIMITED TO BREAKDOWN OF SKIN WITH VARICOSE VEINS: Primary | ICD-10-CM

## 2025-08-12 DIAGNOSIS — L97.811 VENOUS STASIS ULCER OF OTHER PART OF RIGHT LOWER LEG LIMITED TO BREAKDOWN OF SKIN WITH VARICOSE VEINS: Primary | ICD-10-CM

## 2025-08-12 DIAGNOSIS — L97.311 VENOUS STASIS ULCER OF RIGHT ANKLE LIMITED TO BREAKDOWN OF SKIN WITH VARICOSE VEINS: ICD-10-CM

## 2025-08-12 DIAGNOSIS — L03.115 CELLULITIS OF RIGHT LOWER EXTREMITY WITHOUT FOOT: ICD-10-CM

## 2025-08-12 DIAGNOSIS — I83.013 VENOUS STASIS ULCER OF RIGHT ANKLE LIMITED TO BREAKDOWN OF SKIN WITH VARICOSE VEINS: ICD-10-CM

## 2025-08-12 PROCEDURE — G0463 HOSPITAL OUTPT CLINIC VISIT: HCPCS | Performed by: NURSE PRACTITIONER

## 2025-08-12 PROCEDURE — 1159F MED LIST DOCD IN RCRD: CPT | Performed by: NURSE PRACTITIONER

## 2025-08-12 PROCEDURE — 1160F RVW MEDS BY RX/DR IN RCRD: CPT | Performed by: NURSE PRACTITIONER

## 2025-08-12 RX ORDER — SULFAMETHOXAZOLE AND TRIMETHOPRIM 800; 160 MG/1; MG/1
2 TABLET ORAL 2 TIMES DAILY
Qty: 28 TABLET | Refills: 0 | Status: SHIPPED | OUTPATIENT
Start: 2025-08-12 | End: 2025-08-19

## (undated) DEVICE — THE SINGLE USE ETRAP – POLYP TRAP IS USED FOR SUCTION RETRIEVAL OF ENDOSCOPICALLY REMOVED POLYPS.: Brand: ETRAP

## (undated) DEVICE — SOL IRRG H2O PL/BG 1000ML STRL

## (undated) DEVICE — Device

## (undated) DEVICE — SOLIDIFIER LIQLOC PLS 1500CC BT

## (undated) DEVICE — Device: Brand: DEFENDO AIR/WATER/SUCTION AND BIOPSY VALVE

## (undated) DEVICE — SNAR E/S POLYP SNAREMASTER OVL/10MM 2.8X2300MM YEL